# Patient Record
Sex: FEMALE | Race: WHITE | Employment: OTHER | URBAN - METROPOLITAN AREA
[De-identification: names, ages, dates, MRNs, and addresses within clinical notes are randomized per-mention and may not be internally consistent; named-entity substitution may affect disease eponyms.]

---

## 2018-01-01 ENCOUNTER — APPOINTMENT (OUTPATIENT)
Dept: GENERAL RADIOLOGY | Age: 83
DRG: 064 | End: 2018-01-01
Payer: MEDICARE

## 2018-01-01 ENCOUNTER — HOSPITAL ENCOUNTER (INPATIENT)
Age: 83
LOS: 3 days | Discharge: NURSING, REHAB OR LONG-TERM CARE | DRG: 086 | End: 2018-09-20
Attending: EMERGENCY MEDICINE | Admitting: INTERNAL MEDICINE
Payer: MEDICARE

## 2018-01-01 ENCOUNTER — APPOINTMENT (OUTPATIENT)
Dept: CT IMAGING | Age: 83
DRG: 087 | End: 2018-01-01
Payer: MEDICARE

## 2018-01-01 ENCOUNTER — HOSPITAL ENCOUNTER (INPATIENT)
Age: 83
LOS: 7 days | DRG: 064 | End: 2018-09-30
Attending: EMERGENCY MEDICINE | Admitting: INTERNAL MEDICINE
Payer: MEDICARE

## 2018-01-01 ENCOUNTER — APPOINTMENT (OUTPATIENT)
Dept: GENERAL RADIOLOGY | Age: 83
DRG: 086 | End: 2018-01-01
Payer: MEDICARE

## 2018-01-01 ENCOUNTER — APPOINTMENT (OUTPATIENT)
Dept: CT IMAGING | Age: 83
DRG: 064 | End: 2018-01-01
Payer: MEDICARE

## 2018-01-01 ENCOUNTER — APPOINTMENT (OUTPATIENT)
Dept: CT IMAGING | Age: 83
DRG: 086 | End: 2018-01-01
Payer: MEDICARE

## 2018-01-01 ENCOUNTER — HOSPITAL ENCOUNTER (INPATIENT)
Age: 83
LOS: 2 days | Discharge: HOME HEALTH | DRG: 087 | End: 2018-09-05
Attending: EMERGENCY MEDICINE | Admitting: INTERNAL MEDICINE
Payer: MEDICARE

## 2018-01-01 ENCOUNTER — HOSPITAL ENCOUNTER (INPATIENT)
Age: 83
LOS: 1 days | DRG: 064 | End: 2018-09-23
Attending: EMERGENCY MEDICINE | Admitting: INTERNAL MEDICINE
Payer: MEDICARE

## 2018-01-01 VITALS
RESPIRATION RATE: 16 BRPM | WEIGHT: 150 LBS | TEMPERATURE: 97 F | OXYGEN SATURATION: 95 % | DIASTOLIC BLOOD PRESSURE: 57 MMHG | BODY MASS INDEX: 27.44 KG/M2 | SYSTOLIC BLOOD PRESSURE: 134 MMHG | HEART RATE: 79 BPM

## 2018-01-01 VITALS
HEART RATE: 75 BPM | HEIGHT: 63 IN | TEMPERATURE: 97.7 F | RESPIRATION RATE: 16 BRPM | OXYGEN SATURATION: 99 % | BODY MASS INDEX: 29.23 KG/M2 | WEIGHT: 165 LBS | DIASTOLIC BLOOD PRESSURE: 61 MMHG | SYSTOLIC BLOOD PRESSURE: 127 MMHG

## 2018-01-01 VITALS
HEIGHT: 63 IN | BODY MASS INDEX: 21.79 KG/M2 | RESPIRATION RATE: 24 BRPM | HEART RATE: 96 BPM | OXYGEN SATURATION: 90 % | SYSTOLIC BLOOD PRESSURE: 101 MMHG | TEMPERATURE: 99 F | WEIGHT: 123 LBS | DIASTOLIC BLOOD PRESSURE: 46 MMHG

## 2018-01-01 VITALS
DIASTOLIC BLOOD PRESSURE: 68 MMHG | HEART RATE: 82 BPM | HEIGHT: 63 IN | SYSTOLIC BLOOD PRESSURE: 155 MMHG | WEIGHT: 133.1 LBS | TEMPERATURE: 97.5 F | RESPIRATION RATE: 17 BRPM | OXYGEN SATURATION: 98 % | BODY MASS INDEX: 23.58 KG/M2

## 2018-01-01 DIAGNOSIS — S06.5XAA SUBDURAL HEMATOMA: ICD-10-CM

## 2018-01-01 DIAGNOSIS — S06.5XAA SUBDURAL HEMATOMA: Primary | ICD-10-CM

## 2018-01-01 DIAGNOSIS — D69.6 THROMBOCYTOPENIA (HCC): ICD-10-CM

## 2018-01-01 DIAGNOSIS — R41.82 ALTERED MENTAL STATUS, UNSPECIFIED ALTERED MENTAL STATUS TYPE: ICD-10-CM

## 2018-01-01 DIAGNOSIS — E86.0 DEHYDRATION: Primary | ICD-10-CM

## 2018-01-01 DIAGNOSIS — D68.9 COAGULOPATHY (HCC): ICD-10-CM

## 2018-01-01 DIAGNOSIS — R31.9 URINARY TRACT INFECTION WITH HEMATURIA, SITE UNSPECIFIED: ICD-10-CM

## 2018-01-01 DIAGNOSIS — D61.818 PANCYTOPENIA (HCC): ICD-10-CM

## 2018-01-01 DIAGNOSIS — R91.8 LUNG MASS: ICD-10-CM

## 2018-01-01 DIAGNOSIS — S00.12XA PERIORBITAL ECCHYMOSIS OF LEFT EYE, INITIAL ENCOUNTER: ICD-10-CM

## 2018-01-01 DIAGNOSIS — S06.5XAA SDH (SUBDURAL HEMATOMA): ICD-10-CM

## 2018-01-01 DIAGNOSIS — E86.0 DEHYDRATION: ICD-10-CM

## 2018-01-01 DIAGNOSIS — N39.0 URINARY TRACT INFECTION WITH HEMATURIA, SITE UNSPECIFIED: ICD-10-CM

## 2018-01-01 DIAGNOSIS — R41.82 ALTERED MENTAL STATUS, UNSPECIFIED ALTERED MENTAL STATUS TYPE: Primary | ICD-10-CM

## 2018-01-01 DIAGNOSIS — D61.818 PANCYTOPENIA (HCC): Primary | ICD-10-CM

## 2018-01-01 LAB
% INHIBITION AA: 53.7 %
% INHIBITION AA: 66.5 %
% INHIBITION ADP: 32.7 %
% INHIBITION ADP: 71.7 %
ABO/RH: NORMAL
ABO/RH: NORMAL
ALBUMIN SERPL-MCNC: 2 G/DL (ref 3.5–5.2)
ALBUMIN SERPL-MCNC: 2.2 G/DL (ref 3.5–5.2)
ALBUMIN SERPL-MCNC: 3.3 G/DL (ref 3.5–5.2)
ALBUMIN SERPL-MCNC: 3.4 G/DL (ref 3.5–5.2)
ALP BLD-CCNC: 195 U/L (ref 35–104)
ALP BLD-CCNC: 83 U/L (ref 35–104)
ALP BLD-CCNC: 87 U/L (ref 35–104)
ALP BLD-CCNC: 95 U/L (ref 35–104)
ALT SERPL-CCNC: 18 U/L (ref 0–32)
ALT SERPL-CCNC: 20 U/L (ref 0–32)
ALT SERPL-CCNC: 24 U/L (ref 0–32)
ALT SERPL-CCNC: 31 U/L (ref 0–32)
AMYLASE: 20 U/L (ref 20–100)
ANGLE (CLOT STRENGTH): 70 DEGREE (ref 59–74)
ANGLE (CLOT STRENGTH): 77 DEGREE (ref 59–74)
ANION GAP SERPL CALCULATED.3IONS-SCNC: 12 MMOL/L (ref 7–16)
ANION GAP SERPL CALCULATED.3IONS-SCNC: 14 MMOL/L (ref 7–16)
ANION GAP SERPL CALCULATED.3IONS-SCNC: 15 MMOL/L (ref 7–16)
ANION GAP SERPL CALCULATED.3IONS-SCNC: 16 MMOL/L (ref 7–16)
ANION GAP SERPL CALCULATED.3IONS-SCNC: 17 MMOL/L (ref 7–16)
ANION GAP SERPL CALCULATED.3IONS-SCNC: 18 MMOL/L (ref 7–16)
ANION GAP SERPL CALCULATED.3IONS-SCNC: 18 MMOL/L (ref 7–16)
ANION GAP SERPL CALCULATED.3IONS-SCNC: 20 MMOL/L (ref 7–16)
ANION GAP SERPL CALCULATED.3IONS-SCNC: 21 MMOL/L (ref 7–16)
ANION GAP SERPL CALCULATED.3IONS-SCNC: 22 MMOL/L (ref 7–16)
ANISOCYTOSIS: ABNORMAL
ANTIBODY SCREEN: NORMAL
ANTIBODY SCREEN: NORMAL
APTT: 24.3 SEC (ref 24.5–35.1)
APTT: 25.2 SEC (ref 24.5–35.1)
APTT: 26.1 SEC (ref 24.5–35.1)
APTT: 28.1 SEC (ref 24.5–35.1)
AST SERPL-CCNC: 27 U/L (ref 0–31)
AST SERPL-CCNC: 27 U/L (ref 0–31)
AST SERPL-CCNC: 31 U/L (ref 0–31)
AST SERPL-CCNC: 87 U/L (ref 0–31)
ATYPICAL LYMPHOCYTE RELATIVE PERCENT: 0.9 % (ref 0–4)
BACTERIA: ABNORMAL /HPF
BACTERIA: ABNORMAL /HPF
BASOPHILS ABSOLUTE: 0 E9/L (ref 0–0.2)
BASOPHILS ABSOLUTE: 0.01 E9/L (ref 0–0.2)
BASOPHILS ABSOLUTE: 0.05 E9/L (ref 0–0.2)
BASOPHILS RELATIVE PERCENT: 0 % (ref 0–2)
BASOPHILS RELATIVE PERCENT: 0.3 % (ref 0–2)
BASOPHILS RELATIVE PERCENT: 0.5 % (ref 0–2)
BASOPHILS RELATIVE PERCENT: 0.5 % (ref 0–2)
BASOPHILS RELATIVE PERCENT: 0.8 % (ref 0–2)
BASOPHILS RELATIVE PERCENT: 0.9 % (ref 0–2)
BILIRUB SERPL-MCNC: 0.7 MG/DL (ref 0–1.2)
BILIRUB SERPL-MCNC: 0.8 MG/DL (ref 0–1.2)
BILIRUB SERPL-MCNC: 0.8 MG/DL (ref 0–1.2)
BILIRUB SERPL-MCNC: 1 MG/DL (ref 0–1.2)
BILIRUBIN DIRECT: 0.4 MG/DL (ref 0–0.3)
BILIRUBIN URINE: NEGATIVE
BILIRUBIN, INDIRECT: 0.6 MG/DL (ref 0–1)
BLASTS RELATIVE PERCENT: 0.9 % (ref 0–0)
BLASTS RELATIVE PERCENT: 0.9 % (ref 0–0)
BLOOD BANK DISPENSE STATUS: NORMAL
BLOOD BANK PRODUCT CODE: NORMAL
BLOOD CULTURE, ROUTINE: NORMAL
BLOOD, URINE: ABNORMAL
BLOOD, URINE: NEGATIVE
BLOOD, URINE: NEGATIVE
BPU ID: NORMAL
BUN BLDV-MCNC: 20 MG/DL (ref 8–23)
BUN BLDV-MCNC: 22 MG/DL (ref 8–23)
BUN BLDV-MCNC: 23 MG/DL (ref 8–23)
BUN BLDV-MCNC: 27 MG/DL (ref 8–23)
BUN BLDV-MCNC: 30 MG/DL (ref 8–23)
BUN BLDV-MCNC: 32 MG/DL (ref 8–23)
BUN BLDV-MCNC: 34 MG/DL (ref 8–23)
BUN BLDV-MCNC: 34 MG/DL (ref 8–23)
BUN BLDV-MCNC: 35 MG/DL (ref 8–23)
BUN BLDV-MCNC: 39 MG/DL (ref 8–23)
BUN BLDV-MCNC: 40 MG/DL (ref 8–23)
BUN BLDV-MCNC: 44 MG/DL (ref 8–23)
BURR CELLS: ABNORMAL
CALCIUM SERPL-MCNC: 10.4 MG/DL (ref 8.6–10.2)
CALCIUM SERPL-MCNC: 8.6 MG/DL (ref 8.6–10.2)
CALCIUM SERPL-MCNC: 9.1 MG/DL (ref 8.6–10.2)
CALCIUM SERPL-MCNC: 9.3 MG/DL (ref 8.6–10.2)
CALCIUM SERPL-MCNC: 9.4 MG/DL (ref 8.6–10.2)
CALCIUM SERPL-MCNC: 9.5 MG/DL (ref 8.6–10.2)
CALCIUM SERPL-MCNC: 9.5 MG/DL (ref 8.6–10.2)
CALCIUM SERPL-MCNC: 9.7 MG/DL (ref 8.6–10.2)
CALCIUM SERPL-MCNC: 9.8 MG/DL (ref 8.6–10.2)
CALCIUM SERPL-MCNC: 9.9 MG/DL (ref 8.6–10.2)
CHLORIDE BLD-SCNC: 102 MMOL/L (ref 98–107)
CHLORIDE BLD-SCNC: 103 MMOL/L (ref 98–107)
CHLORIDE BLD-SCNC: 104 MMOL/L (ref 98–107)
CHLORIDE BLD-SCNC: 109 MMOL/L (ref 98–107)
CHLORIDE BLD-SCNC: 110 MMOL/L (ref 98–107)
CHLORIDE BLD-SCNC: 112 MMOL/L (ref 98–107)
CHLORIDE BLD-SCNC: 113 MMOL/L (ref 98–107)
CHLORIDE BLD-SCNC: 114 MMOL/L (ref 98–107)
CHLORIDE BLD-SCNC: 117 MMOL/L (ref 98–107)
CHLORIDE BLD-SCNC: 99 MMOL/L (ref 98–107)
CHP ED QC CHECK: YES
CK MB: <1 NG/ML (ref 0–4.3)
CLARITY: ABNORMAL
CLARITY: CLEAR
CLARITY: CLEAR
CO2: 16 MMOL/L (ref 22–29)
CO2: 18 MMOL/L (ref 22–29)
CO2: 19 MMOL/L (ref 22–29)
CO2: 20 MMOL/L (ref 22–29)
CO2: 21 MMOL/L (ref 22–29)
CO2: 22 MMOL/L (ref 22–29)
CO2: 22 MMOL/L (ref 22–29)
CO2: 23 MMOL/L (ref 22–29)
CO2: 24 MMOL/L (ref 22–29)
CO2: 24 MMOL/L (ref 22–29)
COLOR: YELLOW
CREAT SERPL-MCNC: 0.8 MG/DL (ref 0.5–1)
CREAT SERPL-MCNC: 0.8 MG/DL (ref 0.5–1)
CREAT SERPL-MCNC: 0.9 MG/DL (ref 0.5–1)
CREAT SERPL-MCNC: 1 MG/DL (ref 0.5–1)
CREAT SERPL-MCNC: 1.1 MG/DL (ref 0.5–1)
CREAT SERPL-MCNC: 1.6 MG/DL (ref 0.5–1)
CULTURE, BLOOD 2: NORMAL
DESCRIPTION BLOOD BANK: NORMAL
EKG ATRIAL RATE: 72 BPM
EKG ATRIAL RATE: 85 BPM
EKG ATRIAL RATE: 88 BPM
EKG P AXIS: 23 DEGREES
EKG P AXIS: 42 DEGREES
EKG P AXIS: 59 DEGREES
EKG P-R INTERVAL: 144 MS
EKG P-R INTERVAL: 176 MS
EKG P-R INTERVAL: 184 MS
EKG Q-T INTERVAL: 382 MS
EKG Q-T INTERVAL: 398 MS
EKG Q-T INTERVAL: 410 MS
EKG QRS DURATION: 112 MS
EKG QRS DURATION: 118 MS
EKG QRS DURATION: 122 MS
EKG QTC CALCULATION (BAZETT): 435 MS
EKG QTC CALCULATION (BAZETT): 454 MS
EKG QTC CALCULATION (BAZETT): 496 MS
EKG R AXIS: -3 DEGREES
EKG R AXIS: -6 DEGREES
EKG R AXIS: -9 DEGREES
EKG T AXIS: 17 DEGREES
EKG T AXIS: 19 DEGREES
EKG T AXIS: 27 DEGREES
EKG VENTRICULAR RATE: 72 BPM
EKG VENTRICULAR RATE: 85 BPM
EKG VENTRICULAR RATE: 88 BPM
EOSINOPHILS ABSOLUTE: 0 E9/L (ref 0.05–0.5)
EOSINOPHILS ABSOLUTE: 0.01 E9/L (ref 0.05–0.5)
EOSINOPHILS ABSOLUTE: 0.04 E9/L (ref 0.05–0.5)
EOSINOPHILS ABSOLUTE: 0.08 E9/L (ref 0.05–0.5)
EOSINOPHILS ABSOLUTE: 0.09 E9/L (ref 0.05–0.5)
EOSINOPHILS RELATIVE PERCENT: 0 % (ref 0–6)
EOSINOPHILS RELATIVE PERCENT: 0 % (ref 0–6)
EOSINOPHILS RELATIVE PERCENT: 0.2 % (ref 0–6)
EOSINOPHILS RELATIVE PERCENT: 0.2 % (ref 0–6)
EOSINOPHILS RELATIVE PERCENT: 0.3 % (ref 0–6)
EOSINOPHILS RELATIVE PERCENT: 0.5 % (ref 0–6)
EOSINOPHILS RELATIVE PERCENT: 1 % (ref 0–6)
EOSINOPHILS RELATIVE PERCENT: 1 % (ref 0–6)
EOSINOPHILS RELATIVE PERCENT: 1.7 % (ref 0–6)
EPITHELIAL CELLS, UA: ABNORMAL /HPF
EPL-TEG: 0 % (ref 0–15)
EPL-TEG: 0 % (ref 0–15)
FILM ARRAY ADENOVIRUS: NORMAL
FILM ARRAY BORDETELLA PERTUSSIS: NORMAL
FILM ARRAY CHLAMYDOPHILIA PNEUMONIAE: NORMAL
FILM ARRAY CORONAVIRUS 229E: NORMAL
FILM ARRAY CORONAVIRUS HKU1: NORMAL
FILM ARRAY CORONAVIRUS NL63: NORMAL
FILM ARRAY CORONAVIRUS OC43: NORMAL
FILM ARRAY INFLUENZA A VIRUS 09H1: NORMAL
FILM ARRAY INFLUENZA A VIRUS H1: NORMAL
FILM ARRAY INFLUENZA A VIRUS H3: NORMAL
FILM ARRAY INFLUENZA A VIRUS: NORMAL
FILM ARRAY INFLUENZA B: NORMAL
FILM ARRAY METAPNEUMOVIRUS: NORMAL
FILM ARRAY MYCOPLASMA PNEUMONIAE: NORMAL
FILM ARRAY PARAINFLUENZA VIRUS 1: NORMAL
FILM ARRAY PARAINFLUENZA VIRUS 2: NORMAL
FILM ARRAY PARAINFLUENZA VIRUS 3: NORMAL
FILM ARRAY PARAINFLUENZA VIRUS 4: NORMAL
FILM ARRAY RESPIRATORY SYNCITIAL VIRUS: NORMAL
FILM ARRAY RHINOVIRUS/ENTEROVIRUS: NORMAL
G-TEG: 11.4 K D/SC (ref 4.5–11)
G-TEG: 6.4 K D/SC (ref 4.5–11)
GFR AFRICAN AMERICAN: 37
GFR AFRICAN AMERICAN: 57
GFR AFRICAN AMERICAN: >60
GFR NON-AFRICAN AMERICAN: 31 ML/MIN/1.73
GFR NON-AFRICAN AMERICAN: 47 ML/MIN/1.73
GFR NON-AFRICAN AMERICAN: 52 ML/MIN/1.73
GFR NON-AFRICAN AMERICAN: 59 ML/MIN/1.73
GFR NON-AFRICAN AMERICAN: >60 ML/MIN/1.73
GFR NON-AFRICAN AMERICAN: >60 ML/MIN/1.73
GLUCOSE BLD-MCNC: 107 MG/DL (ref 74–109)
GLUCOSE BLD-MCNC: 108 MG/DL (ref 74–109)
GLUCOSE BLD-MCNC: 113 MG/DL (ref 74–109)
GLUCOSE BLD-MCNC: 113 MG/DL (ref 74–109)
GLUCOSE BLD-MCNC: 115 MG/DL (ref 74–109)
GLUCOSE BLD-MCNC: 117 MG/DL
GLUCOSE BLD-MCNC: 117 MG/DL (ref 74–109)
GLUCOSE BLD-MCNC: 122 MG/DL (ref 74–109)
GLUCOSE BLD-MCNC: 130 MG/DL (ref 74–109)
GLUCOSE BLD-MCNC: 139 MG/DL (ref 74–109)
GLUCOSE BLD-MCNC: 151 MG/DL (ref 74–109)
GLUCOSE BLD-MCNC: 338 MG/DL (ref 74–109)
GLUCOSE BLD-MCNC: 46 MG/DL (ref 74–109)
GLUCOSE BLD-MCNC: 76 MG/DL (ref 74–109)
GLUCOSE BLD-MCNC: 95 MG/DL (ref 74–109)
GLUCOSE URINE: NEGATIVE MG/DL
HCT VFR BLD CALC: 20.1 % (ref 34–48)
HCT VFR BLD CALC: 20.2 % (ref 34–48)
HCT VFR BLD CALC: 20.5 % (ref 34–48)
HCT VFR BLD CALC: 21.1 % (ref 34–48)
HCT VFR BLD CALC: 24.3 % (ref 34–48)
HCT VFR BLD CALC: 25.1 % (ref 34–48)
HCT VFR BLD CALC: 26 % (ref 34–48)
HCT VFR BLD CALC: 26.9 % (ref 34–48)
HCT VFR BLD CALC: 27.1 % (ref 34–48)
HCT VFR BLD CALC: 27.6 % (ref 34–48)
HCT VFR BLD CALC: 27.8 % (ref 34–48)
HCT VFR BLD CALC: 27.9 % (ref 34–48)
HCT VFR BLD CALC: 28.4 % (ref 34–48)
HCT VFR BLD CALC: 29.1 % (ref 34–48)
HCT VFR BLD CALC: 29.7 % (ref 34–48)
HEMOGLOBIN: 6.1 G/DL (ref 11.5–15.5)
HEMOGLOBIN: 6.2 G/DL (ref 11.5–15.5)
HEMOGLOBIN: 6.6 G/DL (ref 11.5–15.5)
HEMOGLOBIN: 6.6 G/DL (ref 11.5–15.5)
HEMOGLOBIN: 7.5 G/DL (ref 11.5–15.5)
HEMOGLOBIN: 7.7 G/DL (ref 11.5–15.5)
HEMOGLOBIN: 8.2 G/DL (ref 11.5–15.5)
HEMOGLOBIN: 8.2 G/DL (ref 11.5–15.5)
HEMOGLOBIN: 8.5 G/DL (ref 11.5–15.5)
HEMOGLOBIN: 8.6 G/DL (ref 11.5–15.5)
HEMOGLOBIN: 8.7 G/DL (ref 11.5–15.5)
HEMOGLOBIN: 8.8 G/DL (ref 11.5–15.5)
HEMOGLOBIN: 9.4 G/DL (ref 11.5–15.5)
HEMOGLOBIN: 9.5 G/DL (ref 11.5–15.5)
HEMOGLOBIN: 9.8 G/DL (ref 11.5–15.5)
HYPOCHROMIA: ABNORMAL
IMMATURE GRANULOCYTES #: 0.37 E9/L
IMMATURE GRANULOCYTES %: 11.8 % (ref 0–5)
INR BLD: 1.2
INR BLD: 1.4
INR BLD: 1.5
INR BLD: 2
K (CLOTTING TIME): 0.8 MIN (ref 1–3)
K (CLOTTING TIME): 1.3 MIN (ref 1–3)
KETONES, URINE: NEGATIVE MG/DL
LACTIC ACID: 1.2 MMOL/L (ref 0.5–2.2)
LACTIC ACID: 2 MMOL/L (ref 0.5–2.2)
LEUKOCYTE ESTERASE, URINE: ABNORMAL
LEUKOCYTE ESTERASE, URINE: NEGATIVE
LEUKOCYTE ESTERASE, URINE: NEGATIVE
LIPASE: 6 U/L (ref 13–60)
LY30 (FIBRINOLYSIS): 0 % (ref 0–8)
LY30 (FIBRINOLYSIS): 0 % (ref 0–8)
LYMPHOCYTES ABSOLUTE: 0.92 E9/L (ref 1.5–4)
LYMPHOCYTES ABSOLUTE: 1.05 E9/L (ref 1.5–4)
LYMPHOCYTES ABSOLUTE: 1.16 E9/L (ref 1.5–4)
LYMPHOCYTES ABSOLUTE: 1.17 E9/L (ref 1.5–4)
LYMPHOCYTES ABSOLUTE: 1.63 E9/L (ref 1.5–4)
LYMPHOCYTES ABSOLUTE: 1.85 E9/L (ref 1.5–4)
LYMPHOCYTES ABSOLUTE: 2.11 E9/L (ref 1.5–4)
LYMPHOCYTES ABSOLUTE: 2.18 E9/L (ref 1.5–4)
LYMPHOCYTES ABSOLUTE: 2.46 E9/L (ref 1.5–4)
LYMPHOCYTES RELATIVE PERCENT: 20 % (ref 20–42)
LYMPHOCYTES RELATIVE PERCENT: 27 % (ref 20–42)
LYMPHOCYTES RELATIVE PERCENT: 30.4 % (ref 20–42)
LYMPHOCYTES RELATIVE PERCENT: 37 % (ref 20–42)
LYMPHOCYTES RELATIVE PERCENT: 37.1 % (ref 20–42)
LYMPHOCYTES RELATIVE PERCENT: 44 % (ref 20–42)
LYMPHOCYTES RELATIVE PERCENT: 45 % (ref 20–42)
LYMPHOCYTES RELATIVE PERCENT: 56.2 % (ref 20–42)
LYMPHOCYTES RELATIVE PERCENT: 9 % (ref 20–42)
MA (MAX AMPLITUDE): 56.3 MM (ref 50–70)
MA (MAX AMPLITUDE): 69.5 MM (ref 50–70)
MA-AA: 41 MM
MA-AA: 50.6 MM
MA-ACTIVATED: 33.3 MM
MA-ACTIVATED: 34.3 MM
MA-ADP: 39.8 MM
MA-ADP: 58 MM
MA-TEG BASELINE: 56.3 MM
MA-TEG BASELINE: 69.5 MM
MAGNESIUM: 2.1 MG/DL (ref 1.6–2.6)
MCH RBC QN AUTO: 26.9 PG (ref 26–35)
MCH RBC QN AUTO: 27.3 PG (ref 26–35)
MCH RBC QN AUTO: 27.4 PG (ref 26–35)
MCH RBC QN AUTO: 27.5 PG (ref 26–35)
MCH RBC QN AUTO: 27.6 PG (ref 26–35)
MCH RBC QN AUTO: 27.7 PG (ref 26–35)
MCH RBC QN AUTO: 27.7 PG (ref 26–35)
MCH RBC QN AUTO: 27.9 PG (ref 26–35)
MCH RBC QN AUTO: 28.2 PG (ref 26–35)
MCH RBC QN AUTO: 28.4 PG (ref 26–35)
MCH RBC QN AUTO: 28.6 PG (ref 26–35)
MCH RBC QN AUTO: 28.7 PG (ref 26–35)
MCH RBC QN AUTO: 28.9 PG (ref 26–35)
MCHC RBC AUTO-ENTMCNC: 30.2 % (ref 32–34.5)
MCHC RBC AUTO-ENTMCNC: 30.3 % (ref 32–34.5)
MCHC RBC AUTO-ENTMCNC: 30.5 % (ref 32–34.5)
MCHC RBC AUTO-ENTMCNC: 30.7 % (ref 32–34.5)
MCHC RBC AUTO-ENTMCNC: 30.8 % (ref 32–34.5)
MCHC RBC AUTO-ENTMCNC: 30.8 % (ref 32–34.5)
MCHC RBC AUTO-ENTMCNC: 30.9 % (ref 32–34.5)
MCHC RBC AUTO-ENTMCNC: 31 % (ref 32–34.5)
MCHC RBC AUTO-ENTMCNC: 31.3 % (ref 32–34.5)
MCHC RBC AUTO-ENTMCNC: 31.5 % (ref 32–34.5)
MCHC RBC AUTO-ENTMCNC: 32.1 % (ref 32–34.5)
MCHC RBC AUTO-ENTMCNC: 32.6 % (ref 32–34.5)
MCHC RBC AUTO-ENTMCNC: 32.7 % (ref 32–34.5)
MCHC RBC AUTO-ENTMCNC: 33 % (ref 32–34.5)
MCHC RBC AUTO-ENTMCNC: 33.8 % (ref 32–34.5)
MCV RBC AUTO: 85.5 FL (ref 80–99.9)
MCV RBC AUTO: 85.6 FL (ref 80–99.9)
MCV RBC AUTO: 86 FL (ref 80–99.9)
MCV RBC AUTO: 86.1 FL (ref 80–99.9)
MCV RBC AUTO: 86.3 FL (ref 80–99.9)
MCV RBC AUTO: 87.6 FL (ref 80–99.9)
MCV RBC AUTO: 88.5 FL (ref 80–99.9)
MCV RBC AUTO: 88.6 FL (ref 80–99.9)
MCV RBC AUTO: 89.3 FL (ref 80–99.9)
MCV RBC AUTO: 89.3 FL (ref 80–99.9)
MCV RBC AUTO: 90 FL (ref 80–99.9)
MCV RBC AUTO: 90.9 FL (ref 80–99.9)
MCV RBC AUTO: 90.9 FL (ref 80–99.9)
MCV RBC AUTO: 91.5 FL (ref 80–99.9)
MCV RBC AUTO: 92.2 FL (ref 80–99.9)
METAMYELOCYTES RELATIVE PERCENT: 0.9 % (ref 0–1)
METAMYELOCYTES RELATIVE PERCENT: 1.7 % (ref 0–1)
METAMYELOCYTES RELATIVE PERCENT: 2 % (ref 0–1)
METAMYELOCYTES RELATIVE PERCENT: 2.6 % (ref 0–1)
METAMYELOCYTES RELATIVE PERCENT: 2.8 % (ref 0–1)
METAMYELOCYTES RELATIVE PERCENT: 3 % (ref 0–1)
METAMYELOCYTES RELATIVE PERCENT: 4 % (ref 0–1)
METER GLUCOSE: 117 MG/DL (ref 70–110)
MONOCYTES ABSOLUTE: 0 E9/L (ref 0.1–0.95)
MONOCYTES ABSOLUTE: 0.12 E9/L (ref 0.1–0.95)
MONOCYTES ABSOLUTE: 0.16 E9/L (ref 0.1–0.95)
MONOCYTES ABSOLUTE: 0.17 E9/L (ref 0.1–0.95)
MONOCYTES ABSOLUTE: 0.2 E9/L (ref 0.1–0.95)
MONOCYTES ABSOLUTE: 0.22 E9/L (ref 0.1–0.95)
MONOCYTES ABSOLUTE: 0.3 E9/L (ref 0.1–0.95)
MONOCYTES ABSOLUTE: 0.61 E9/L (ref 0.1–0.95)
MONOCYTES ABSOLUTE: 0.78 E9/L (ref 0.1–0.95)
MONOCYTES RELATIVE PERCENT: 10 % (ref 2–12)
MONOCYTES RELATIVE PERCENT: 12.2 % (ref 2–12)
MONOCYTES RELATIVE PERCENT: 2.8 % (ref 2–12)
MONOCYTES RELATIVE PERCENT: 2.9 % (ref 2–12)
MONOCYTES RELATIVE PERCENT: 3.5 % (ref 2–12)
MONOCYTES RELATIVE PERCENT: 4.3 % (ref 2–12)
MONOCYTES RELATIVE PERCENT: 5 % (ref 2–12)
MONOCYTES RELATIVE PERCENT: 6 % (ref 2–12)
MONOCYTES RELATIVE PERCENT: 9.6 % (ref 2–12)
MYELOCYTE PERCENT: 1.9 % (ref 0–0)
MYELOCYTE PERCENT: 2 % (ref 0–0)
MYELOCYTE PERCENT: 3.5 % (ref 0–0)
MYELOCYTE PERCENT: 3.5 % (ref 0–0)
NEUTROPHILS ABSOLUTE: 1.28 E9/L (ref 1.8–7.3)
NEUTROPHILS ABSOLUTE: 1.6 E9/L (ref 1.8–7.3)
NEUTROPHILS ABSOLUTE: 2.17 E9/L (ref 1.8–7.3)
NEUTROPHILS ABSOLUTE: 2.46 E9/L (ref 1.8–7.3)
NEUTROPHILS ABSOLUTE: 2.54 E9/L (ref 1.8–7.3)
NEUTROPHILS ABSOLUTE: 2.86 E9/L (ref 1.8–7.3)
NEUTROPHILS ABSOLUTE: 3.65 E9/L (ref 1.8–7.3)
NEUTROPHILS ABSOLUTE: 4.84 E9/L (ref 1.8–7.3)
NEUTROPHILS ABSOLUTE: 8.36 E9/L (ref 1.8–7.3)
NEUTROPHILS RELATIVE PERCENT: 39 % (ref 43–80)
NEUTROPHILS RELATIVE PERCENT: 40.9 % (ref 43–80)
NEUTROPHILS RELATIVE PERCENT: 48.6 % (ref 43–80)
NEUTROPHILS RELATIVE PERCENT: 52 % (ref 43–80)
NEUTROPHILS RELATIVE PERCENT: 52.3 % (ref 43–80)
NEUTROPHILS RELATIVE PERCENT: 57 % (ref 43–80)
NEUTROPHILS RELATIVE PERCENT: 59.1 % (ref 43–80)
NEUTROPHILS RELATIVE PERCENT: 67.8 % (ref 43–80)
NEUTROPHILS RELATIVE PERCENT: 76 % (ref 43–80)
NITRITE, URINE: NEGATIVE
NUCLEATED RED BLOOD CELLS: 0.9 /100 WBC
NUCLEATED RED BLOOD CELLS: 1 /100 WBC
NUCLEATED RED BLOOD CELLS: 2 /100 WBC
ORGANISM: ABNORMAL
ORGANISM: ABNORMAL
OVALOCYTES: ABNORMAL
PDW BLD-RTO: 15.1 FL (ref 11.5–15)
PDW BLD-RTO: 15.4 FL (ref 11.5–15)
PDW BLD-RTO: 15.6 FL (ref 11.5–15)
PDW BLD-RTO: 15.8 FL (ref 11.5–15)
PDW BLD-RTO: 16.2 FL (ref 11.5–15)
PDW BLD-RTO: 16.6 FL (ref 11.5–15)
PDW BLD-RTO: 16.7 FL (ref 11.5–15)
PDW BLD-RTO: 16.8 FL (ref 11.5–15)
PDW BLD-RTO: 16.8 FL (ref 11.5–15)
PDW BLD-RTO: 16.9 FL (ref 11.5–15)
PDW BLD-RTO: 17 FL (ref 11.5–15)
PDW BLD-RTO: 17.2 FL (ref 11.5–15)
PDW BLD-RTO: 17.2 FL (ref 11.5–15)
PH UA: 5 (ref 5–9)
PH UA: 5.5 (ref 5–9)
PH UA: 5.5 (ref 5–9)
PLATELET # BLD: 11 E9/L (ref 130–450)
PLATELET # BLD: 13 E9/L (ref 130–450)
PLATELET # BLD: 14 E9/L (ref 130–450)
PLATELET # BLD: 16 E9/L (ref 130–450)
PLATELET # BLD: 19 E9/L (ref 130–450)
PLATELET # BLD: 22 E9/L (ref 130–450)
PLATELET # BLD: 22 E9/L (ref 130–450)
PLATELET # BLD: 26 E9/L (ref 130–450)
PLATELET # BLD: 4 E9/L (ref 130–450)
PLATELET # BLD: 7 E9/L (ref 130–450)
PLATELET # BLD: 73 E9/L (ref 130–450)
PLATELET # BLD: 8 E9/L (ref 130–450)
PLATELET # BLD: 93 E9/L (ref 130–450)
PLATELET CONFIRMATION: NORMAL
PMV BLD AUTO: 10.4 FL (ref 7–12)
PMV BLD AUTO: 10.6 FL (ref 7–12)
PMV BLD AUTO: 10.9 FL (ref 7–12)
PMV BLD AUTO: 11.4 FL (ref 7–12)
PMV BLD AUTO: 11.6 FL (ref 7–12)
PMV BLD AUTO: 12.2 FL (ref 7–12)
PMV BLD AUTO: 9.5 FL (ref 7–12)
PMV BLD AUTO: 9.8 FL (ref 7–12)
PMV BLD AUTO: ABNORMAL FL (ref 7–12)
POIKILOCYTES: ABNORMAL
POLYCHROMASIA: ABNORMAL
POTASSIUM SERPL-SCNC: 2.9 MMOL/L (ref 3.5–5)
POTASSIUM SERPL-SCNC: 3.1 MMOL/L (ref 3.5–5)
POTASSIUM SERPL-SCNC: 3.2 MMOL/L (ref 3.5–5)
POTASSIUM SERPL-SCNC: 3.5 MMOL/L (ref 3.5–5)
POTASSIUM SERPL-SCNC: 3.7 MMOL/L (ref 3.5–5)
POTASSIUM SERPL-SCNC: 3.9 MMOL/L (ref 3.5–5)
POTASSIUM SERPL-SCNC: 3.9 MMOL/L (ref 3.5–5)
POTASSIUM SERPL-SCNC: 4 MMOL/L (ref 3.5–5)
POTASSIUM SERPL-SCNC: 4 MMOL/L (ref 3.5–5)
POTASSIUM SERPL-SCNC: 4.1 MMOL/L (ref 3.5–5)
POTASSIUM SERPL-SCNC: 4.1 MMOL/L (ref 3.5–5)
POTASSIUM SERPL-SCNC: 4.3 MMOL/L (ref 3.5–5)
POTASSIUM SERPL-SCNC: 4.6 MMOL/L (ref 3.5–5)
POTASSIUM SERPL-SCNC: 4.9 MMOL/L (ref 3.5–5)
POTASSIUM SERPL-SCNC: 5.5 MMOL/L (ref 3.5–5)
PRO-BNP: 1522 PG/ML (ref 0–450)
PRO-BNP: 5702 PG/ML (ref 0–450)
PROMYELOCYTES PERCENT: 0.9 % (ref 0–0)
PROMYELOCYTES PERCENT: 0.9 % (ref 0–0)
PROMYELOCYTES PERCENT: 1 % (ref 0–0)
PROMYELOCYTES PERCENT: 3 % (ref 0–0)
PROTEIN UA: 30 MG/DL
PROTEIN UA: NEGATIVE MG/DL
PROTEIN UA: NORMAL MG/DL
PROTHROMBIN TIME: 13.8 SEC (ref 9.3–12.4)
PROTHROMBIN TIME: 16 SEC (ref 9.3–12.4)
PROTHROMBIN TIME: 17.4 SEC (ref 9.3–12.4)
PROTHROMBIN TIME: 22.9 SEC (ref 9.3–12.4)
R (REACTION TIME): 5 MIN (ref 5–10)
R (REACTION TIME): 5.5 MIN (ref 5–10)
RBC # BLD: 2.24 E12/L (ref 3.5–5.5)
RBC # BLD: 2.27 E12/L (ref 3.5–5.5)
RBC # BLD: 2.34 E12/L (ref 3.5–5.5)
RBC # BLD: 2.41 E12/L (ref 3.5–5.5)
RBC # BLD: 2.72 E12/L (ref 3.5–5.5)
RBC # BLD: 2.79 E12/L (ref 3.5–5.5)
RBC # BLD: 2.86 E12/L (ref 3.5–5.5)
RBC # BLD: 2.96 E12/L (ref 3.5–5.5)
RBC # BLD: 3.08 E12/L (ref 3.5–5.5)
RBC # BLD: 3.09 E12/L (ref 3.5–5.5)
RBC # BLD: 3.15 E12/L (ref 3.5–5.5)
RBC # BLD: 3.15 E12/L (ref 3.5–5.5)
RBC # BLD: 3.25 E12/L (ref 3.5–5.5)
RBC # BLD: 3.4 E12/L (ref 3.5–5.5)
RBC # BLD: 3.45 E12/L (ref 3.5–5.5)
RBC UA: ABNORMAL /HPF (ref 0–2)
RBC UA: ABNORMAL /HPF (ref 0–2)
SCHISTOCYTES: ABNORMAL
SODIUM BLD-SCNC: 138 MMOL/L (ref 132–146)
SODIUM BLD-SCNC: 138 MMOL/L (ref 132–146)
SODIUM BLD-SCNC: 139 MMOL/L (ref 132–146)
SODIUM BLD-SCNC: 141 MMOL/L (ref 132–146)
SODIUM BLD-SCNC: 141 MMOL/L (ref 132–146)
SODIUM BLD-SCNC: 142 MMOL/L (ref 132–146)
SODIUM BLD-SCNC: 142 MMOL/L (ref 132–146)
SODIUM BLD-SCNC: 143 MMOL/L (ref 132–146)
SODIUM BLD-SCNC: 145 MMOL/L (ref 132–146)
SODIUM BLD-SCNC: 146 MMOL/L (ref 132–146)
SODIUM BLD-SCNC: 147 MMOL/L (ref 132–146)
SODIUM BLD-SCNC: 152 MMOL/L (ref 132–146)
SODIUM BLD-SCNC: 154 MMOL/L (ref 132–146)
SODIUM BLD-SCNC: 157 MMOL/L (ref 132–146)
SPECIFIC GRAVITY UA: 1.01 (ref 1–1.03)
SPECIFIC GRAVITY UA: 1.01 (ref 1–1.03)
SPECIFIC GRAVITY UA: 1.02 (ref 1–1.03)
T3 UPTAKE PERCENT: 32.4 % (ref 22.5–37)
T4 FREE: 0.79 NG/DL (ref 0.93–1.7)
T4 FREE: 0.96 NG/DL (ref 0.93–1.7)
TOTAL CK: 59 U/L (ref 20–180)
TOTAL PROTEIN: 5.6 G/DL (ref 6.4–8.3)
TOTAL PROTEIN: 5.9 G/DL (ref 6.4–8.3)
TOTAL PROTEIN: 7.2 G/DL (ref 6.4–8.3)
TOTAL PROTEIN: 7.4 G/DL (ref 6.4–8.3)
TROPONIN: 0.03 NG/ML (ref 0–0.03)
TROPONIN: <0.01 NG/ML (ref 0–0.03)
TROPONIN: <0.01 NG/ML (ref 0–0.03)
TSH SERPL DL<=0.05 MIU/L-ACNC: 1.02 UIU/ML (ref 0.27–4.2)
TSH SERPL DL<=0.05 MIU/L-ACNC: 1.02 UIU/ML (ref 0.27–4.2)
URINE CULTURE, ROUTINE: ABNORMAL
URINE CULTURE, ROUTINE: NORMAL
URINE CULTURE, ROUTINE: NORMAL
UROBILINOGEN, URINE: 0.2 E.U./DL
UROBILINOGEN, URINE: 1 E.U./DL
UROBILINOGEN, URINE: 4 E.U./DL
WBC # BLD: 10.2 E9/L (ref 4.5–11.5)
WBC # BLD: 3.1 E9/L (ref 4.5–11.5)
WBC # BLD: 3.9 E9/L (ref 4.5–11.5)
WBC # BLD: 4.1 E9/L (ref 4.5–11.5)
WBC # BLD: 4.4 E9/L (ref 4.5–11.5)
WBC # BLD: 4.6 E9/L (ref 4.5–11.5)
WBC # BLD: 5 E9/L (ref 4.5–11.5)
WBC # BLD: 5 E9/L (ref 4.5–11.5)
WBC # BLD: 5.3 E9/L (ref 4.5–11.5)
WBC # BLD: 5.6 E9/L (ref 4.5–11.5)
WBC # BLD: 6.5 E9/L (ref 4.5–11.5)
WBC # BLD: 7.8 E9/L (ref 4.5–11.5)
WBC # BLD: 7.9 E9/L (ref 4.5–11.5)
WBC UA: ABNORMAL /HPF (ref 0–5)
WBC UA: ABNORMAL /HPF (ref 0–5)

## 2018-01-01 PROCEDURE — 6360000002 HC RX W HCPCS: Performed by: INTERNAL MEDICINE

## 2018-01-01 PROCEDURE — 86901 BLOOD TYPING SEROLOGIC RH(D): CPT

## 2018-01-01 PROCEDURE — 80048 BASIC METABOLIC PNL TOTAL CA: CPT

## 2018-01-01 PROCEDURE — 36415 COLL VENOUS BLD VENIPUNCTURE: CPT

## 2018-01-01 PROCEDURE — 2580000003 HC RX 258: Performed by: INTERNAL MEDICINE

## 2018-01-01 PROCEDURE — 97530 THERAPEUTIC ACTIVITIES: CPT

## 2018-01-01 PROCEDURE — 6370000000 HC RX 637 (ALT 250 FOR IP): Performed by: INTERNAL MEDICINE

## 2018-01-01 PROCEDURE — 86900 BLOOD TYPING SEROLOGIC ABO: CPT

## 2018-01-01 PROCEDURE — 86850 RBC ANTIBODY SCREEN: CPT

## 2018-01-01 PROCEDURE — 84484 ASSAY OF TROPONIN QUANT: CPT

## 2018-01-01 PROCEDURE — C9113 INJ PANTOPRAZOLE SODIUM, VIA: HCPCS | Performed by: INTERNAL MEDICINE

## 2018-01-01 PROCEDURE — 85610 PROTHROMBIN TIME: CPT

## 2018-01-01 PROCEDURE — 97166 OT EVAL MOD COMPLEX 45 MIN: CPT

## 2018-01-01 PROCEDURE — 2700000000 HC OXYGEN THERAPY PER DAY

## 2018-01-01 PROCEDURE — G8982 BODY POS GOAL STATUS: HCPCS

## 2018-01-01 PROCEDURE — 6370000000 HC RX 637 (ALT 250 FOR IP): Performed by: STUDENT IN AN ORGANIZED HEALTH CARE EDUCATION/TRAINING PROGRAM

## 2018-01-01 PROCEDURE — 85576 BLOOD PLATELET AGGREGATION: CPT

## 2018-01-01 PROCEDURE — 1200000000 HC SEMI PRIVATE

## 2018-01-01 PROCEDURE — G8988 SELF CARE GOAL STATUS: HCPCS

## 2018-01-01 PROCEDURE — 81003 URINALYSIS AUTO W/O SCOPE: CPT

## 2018-01-01 PROCEDURE — 99233 SBSQ HOSP IP/OBS HIGH 50: CPT | Performed by: EMERGENCY MEDICINE

## 2018-01-01 PROCEDURE — 85347 COAGULATION TIME ACTIVATED: CPT

## 2018-01-01 PROCEDURE — 85025 COMPLETE CBC W/AUTO DIFF WBC: CPT

## 2018-01-01 PROCEDURE — 99285 EMERGENCY DEPT VISIT HI MDM: CPT

## 2018-01-01 PROCEDURE — 93005 ELECTROCARDIOGRAM TRACING: CPT | Performed by: EMERGENCY MEDICINE

## 2018-01-01 PROCEDURE — 81001 URINALYSIS AUTO W/SCOPE: CPT

## 2018-01-01 PROCEDURE — 80053 COMPREHEN METABOLIC PANEL: CPT

## 2018-01-01 PROCEDURE — 2140000000 HC CCU INTERMEDIATE R&B

## 2018-01-01 PROCEDURE — P9035 PLATELET PHERES LEUKOREDUCED: HCPCS

## 2018-01-01 PROCEDURE — 99232 SBSQ HOSP IP/OBS MODERATE 35: CPT | Performed by: NEUROLOGICAL SURGERY

## 2018-01-01 PROCEDURE — 85384 FIBRINOGEN ACTIVITY: CPT

## 2018-01-01 PROCEDURE — 84443 ASSAY THYROID STIM HORMONE: CPT

## 2018-01-01 PROCEDURE — 83735 ASSAY OF MAGNESIUM: CPT

## 2018-01-01 PROCEDURE — 85027 COMPLETE CBC AUTOMATED: CPT

## 2018-01-01 PROCEDURE — 87486 CHLMYD PNEUM DNA AMP PROBE: CPT

## 2018-01-01 PROCEDURE — 70450 CT HEAD/BRAIN W/O DYE: CPT

## 2018-01-01 PROCEDURE — G8978 MOBILITY CURRENT STATUS: HCPCS

## 2018-01-01 PROCEDURE — 6370000000 HC RX 637 (ALT 250 FOR IP)

## 2018-01-01 PROCEDURE — G8987 SELF CARE CURRENT STATUS: HCPCS

## 2018-01-01 PROCEDURE — C9132 KCENTRA, PER I.U.: HCPCS | Performed by: EMERGENCY MEDICINE

## 2018-01-01 PROCEDURE — 87088 URINE BACTERIA CULTURE: CPT

## 2018-01-01 PROCEDURE — 97162 PT EVAL MOD COMPLEX 30 MIN: CPT

## 2018-01-01 PROCEDURE — 6360000002 HC RX W HCPCS: Performed by: EMERGENCY MEDICINE

## 2018-01-01 PROCEDURE — G8981 BODY POS CURRENT STATUS: HCPCS

## 2018-01-01 PROCEDURE — 74176 CT ABD & PELVIS W/O CONTRAST: CPT

## 2018-01-01 PROCEDURE — 87186 SC STD MICRODIL/AGAR DIL: CPT

## 2018-01-01 PROCEDURE — 82550 ASSAY OF CK (CPK): CPT

## 2018-01-01 PROCEDURE — 71250 CT THORAX DX C-: CPT

## 2018-01-01 PROCEDURE — 2500000003 HC RX 250 WO HCPCS: Performed by: INTERNAL MEDICINE

## 2018-01-01 PROCEDURE — 99223 1ST HOSP IP/OBS HIGH 75: CPT | Performed by: SURGERY

## 2018-01-01 PROCEDURE — 80076 HEPATIC FUNCTION PANEL: CPT

## 2018-01-01 PROCEDURE — 87798 DETECT AGENT NOS DNA AMP: CPT

## 2018-01-01 PROCEDURE — 85730 THROMBOPLASTIN TIME PARTIAL: CPT

## 2018-01-01 PROCEDURE — 99284 EMERGENCY DEPT VISIT MOD MDM: CPT

## 2018-01-01 PROCEDURE — 83605 ASSAY OF LACTIC ACID: CPT

## 2018-01-01 PROCEDURE — 82150 ASSAY OF AMYLASE: CPT

## 2018-01-01 PROCEDURE — 83880 ASSAY OF NATRIURETIC PEPTIDE: CPT

## 2018-01-01 PROCEDURE — 84132 ASSAY OF SERUM POTASSIUM: CPT

## 2018-01-01 PROCEDURE — 82962 GLUCOSE BLOOD TEST: CPT

## 2018-01-01 PROCEDURE — 82553 CREATINE MB FRACTION: CPT

## 2018-01-01 PROCEDURE — 36430 TRANSFUSION BLD/BLD COMPNT: CPT

## 2018-01-01 PROCEDURE — 97161 PT EVAL LOW COMPLEX 20 MIN: CPT

## 2018-01-01 PROCEDURE — P9016 RBC LEUKOCYTES REDUCED: HCPCS

## 2018-01-01 PROCEDURE — 70486 CT MAXILLOFACIAL W/O DYE: CPT

## 2018-01-01 PROCEDURE — 71045 X-RAY EXAM CHEST 1 VIEW: CPT

## 2018-01-01 PROCEDURE — 99222 1ST HOSP IP/OBS MODERATE 55: CPT | Performed by: NEUROLOGICAL SURGERY

## 2018-01-01 PROCEDURE — 99221 1ST HOSP IP/OBS SF/LOW 40: CPT | Performed by: PSYCHIATRY & NEUROLOGY

## 2018-01-01 PROCEDURE — 99223 1ST HOSP IP/OBS HIGH 75: CPT | Performed by: CLINICAL NURSE SPECIALIST

## 2018-01-01 PROCEDURE — 83690 ASSAY OF LIPASE: CPT

## 2018-01-01 PROCEDURE — 99231 SBSQ HOSP IP/OBS SF/LOW 25: CPT | Performed by: SURGERY

## 2018-01-01 PROCEDURE — 97165 OT EVAL LOW COMPLEX 30 MIN: CPT

## 2018-01-01 PROCEDURE — 99232 SBSQ HOSP IP/OBS MODERATE 35: CPT | Performed by: CLINICAL NURSE SPECIALIST

## 2018-01-01 PROCEDURE — 84439 ASSAY OF FREE THYROXINE: CPT

## 2018-01-01 PROCEDURE — 2580000003 HC RX 258: Performed by: EMERGENCY MEDICINE

## 2018-01-01 PROCEDURE — G8979 MOBILITY GOAL STATUS: HCPCS

## 2018-01-01 PROCEDURE — 99233 SBSQ HOSP IP/OBS HIGH 50: CPT | Performed by: CLINICAL NURSE SPECIALIST

## 2018-01-01 PROCEDURE — 87077 CULTURE AEROBIC IDENTIFY: CPT

## 2018-01-01 PROCEDURE — 2580000003 HC RX 258: Performed by: STUDENT IN AN ORGANIZED HEALTH CARE EDUCATION/TRAINING PROGRAM

## 2018-01-01 PROCEDURE — 87633 RESP VIRUS 12-25 TARGETS: CPT

## 2018-01-01 PROCEDURE — 87581 M.PNEUMON DNA AMP PROBE: CPT

## 2018-01-01 PROCEDURE — 87040 BLOOD CULTURE FOR BACTERIA: CPT

## 2018-01-01 PROCEDURE — 92611 MOTION FLUOROSCOPY/SWALLOW: CPT

## 2018-01-01 PROCEDURE — 86923 COMPATIBILITY TEST ELECTRIC: CPT

## 2018-01-01 PROCEDURE — 99232 SBSQ HOSP IP/OBS MODERATE 35: CPT | Performed by: SURGERY

## 2018-01-01 RX ORDER — LEVOTHYROXINE SODIUM 0.05 MG/1
50 TABLET ORAL DAILY
Status: DISCONTINUED | OUTPATIENT
Start: 2018-01-01 | End: 2018-01-01 | Stop reason: HOSPADM

## 2018-01-01 RX ORDER — MORPHINE SULFATE 20 MG/ML
5 SOLUTION ORAL EVERY 4 HOURS PRN
Status: DISCONTINUED | OUTPATIENT
Start: 2018-01-01 | End: 2018-01-01

## 2018-01-01 RX ORDER — SODIUM CHLORIDE 0.9 % (FLUSH) 0.9 %
10 SYRINGE (ML) INJECTION EVERY 12 HOURS SCHEDULED
Status: CANCELLED | OUTPATIENT
Start: 2018-01-01

## 2018-01-01 RX ORDER — ONDANSETRON 2 MG/ML
4 INJECTION INTRAMUSCULAR; INTRAVENOUS EVERY 6 HOURS PRN
Status: DISCONTINUED | OUTPATIENT
Start: 2018-01-01 | End: 2018-01-01 | Stop reason: HOSPADM

## 2018-01-01 RX ORDER — ACETAMINOPHEN 500 MG
500 TABLET ORAL EVERY 6 HOURS PRN
Status: DISCONTINUED | OUTPATIENT
Start: 2018-01-01 | End: 2018-01-01 | Stop reason: HOSPADM

## 2018-01-01 RX ORDER — 0.9 % SODIUM CHLORIDE 0.9 %
250 INTRAVENOUS SOLUTION INTRAVENOUS ONCE
Status: DISCONTINUED | OUTPATIENT
Start: 2018-01-01 | End: 2018-01-01

## 2018-01-01 RX ORDER — SODIUM CHLORIDE 450 MG/100ML
INJECTION, SOLUTION INTRAVENOUS CONTINUOUS
Status: DISCONTINUED | OUTPATIENT
Start: 2018-01-01 | End: 2018-01-01

## 2018-01-01 RX ORDER — SODIUM CHLORIDE 9 MG/ML
INJECTION, SOLUTION INTRAVENOUS CONTINUOUS
Status: DISCONTINUED | OUTPATIENT
Start: 2018-01-01 | End: 2018-01-01 | Stop reason: HOSPADM

## 2018-01-01 RX ORDER — 0.9 % SODIUM CHLORIDE 0.9 %
250 INTRAVENOUS SOLUTION INTRAVENOUS ONCE
Status: COMPLETED | OUTPATIENT
Start: 2018-01-01 | End: 2018-01-01

## 2018-01-01 RX ORDER — FAMOTIDINE 20 MG/1
20 TABLET, FILM COATED ORAL DAILY
Status: DISCONTINUED | OUTPATIENT
Start: 2018-01-01 | End: 2018-01-01 | Stop reason: HOSPADM

## 2018-01-01 RX ORDER — 0.9 % SODIUM CHLORIDE 0.9 %
1000 INTRAVENOUS SOLUTION INTRAVENOUS ONCE
Status: COMPLETED | OUTPATIENT
Start: 2018-01-01 | End: 2018-01-01

## 2018-01-01 RX ORDER — SODIUM CHLORIDE 0.9 % (FLUSH) 0.9 %
10 SYRINGE (ML) INJECTION PRN
Status: CANCELLED | OUTPATIENT
Start: 2018-01-01

## 2018-01-01 RX ORDER — SODIUM CHLORIDE 0.9 % (FLUSH) 0.9 %
10 SYRINGE (ML) INJECTION EVERY 12 HOURS SCHEDULED
Status: DISCONTINUED | OUTPATIENT
Start: 2018-01-01 | End: 2018-01-01 | Stop reason: HOSPADM

## 2018-01-01 RX ORDER — ACETAMINOPHEN 325 MG/1
650 TABLET ORAL EVERY 4 HOURS PRN
Qty: 120 TABLET | Refills: 3 | DISCHARGE
Start: 2018-01-01

## 2018-01-01 RX ORDER — DEXTROSE, SODIUM CHLORIDE, AND POTASSIUM CHLORIDE 5; .45; .15 G/100ML; G/100ML; G/100ML
INJECTION INTRAVENOUS CONTINUOUS
Status: DISCONTINUED | OUTPATIENT
Start: 2018-01-01 | End: 2018-01-01

## 2018-01-01 RX ORDER — POTASSIUM CHLORIDE 20 MEQ/1
40 TABLET, EXTENDED RELEASE ORAL 2 TIMES DAILY
Status: DISCONTINUED | OUTPATIENT
Start: 2018-01-01 | End: 2018-01-01 | Stop reason: HOSPADM

## 2018-01-01 RX ORDER — ACETAMINOPHEN 325 MG/1
650 TABLET ORAL SEE ADMIN INSTRUCTIONS
Status: DISCONTINUED | OUTPATIENT
Start: 2018-01-01 | End: 2018-01-01

## 2018-01-01 RX ORDER — MORPHINE SULFATE 2 MG/ML
1 INJECTION, SOLUTION INTRAMUSCULAR; INTRAVENOUS EVERY 4 HOURS PRN
Status: DISCONTINUED | OUTPATIENT
Start: 2018-01-01 | End: 2018-01-01

## 2018-01-01 RX ORDER — MORPHINE SULFATE 10 MG/5ML
5 SOLUTION ORAL ONCE
Status: COMPLETED | OUTPATIENT
Start: 2018-01-01 | End: 2018-01-01

## 2018-01-01 RX ORDER — POTASSIUM CHLORIDE 750 MG/1
10 TABLET, EXTENDED RELEASE ORAL DAILY
Status: DISCONTINUED | OUTPATIENT
Start: 2018-01-01 | End: 2018-01-01 | Stop reason: HOSPADM

## 2018-01-01 RX ORDER — FAMOTIDINE 20 MG/1
20 TABLET, FILM COATED ORAL 2 TIMES DAILY
Status: DISCONTINUED | OUTPATIENT
Start: 2018-01-01 | End: 2018-01-01 | Stop reason: SDUPTHER

## 2018-01-01 RX ORDER — ACETAMINOPHEN 650 MG/1
SUPPOSITORY RECTAL
Status: COMPLETED
Start: 2018-01-01 | End: 2018-01-01

## 2018-01-01 RX ORDER — POTASSIUM CHLORIDE 1.5 G/1.77G
40 POWDER, FOR SOLUTION ORAL DAILY
Qty: 30 EACH | Refills: 3 | DISCHARGE
Start: 2018-01-01

## 2018-01-01 RX ORDER — FUROSEMIDE 20 MG/1
20 TABLET ORAL DAILY
Status: DISCONTINUED | OUTPATIENT
Start: 2018-01-01 | End: 2018-01-01 | Stop reason: HOSPADM

## 2018-01-01 RX ORDER — LEVOTHYROXINE SODIUM 0.05 MG/1
50 TABLET ORAL DAILY
Status: DISCONTINUED | OUTPATIENT
Start: 2018-01-01 | End: 2018-01-01

## 2018-01-01 RX ORDER — PANTOPRAZOLE SODIUM 40 MG/10ML
40 INJECTION, POWDER, LYOPHILIZED, FOR SOLUTION INTRAVENOUS DAILY
Status: DISCONTINUED | OUTPATIENT
Start: 2018-01-01 | End: 2018-10-01 | Stop reason: HOSPADM

## 2018-01-01 RX ORDER — SIMVASTATIN 20 MG
20 TABLET ORAL NIGHTLY
Status: DISCONTINUED | OUTPATIENT
Start: 2018-01-01 | End: 2018-01-01 | Stop reason: HOSPADM

## 2018-01-01 RX ORDER — SODIUM CHLORIDE 0.9 % (FLUSH) 0.9 %
10 SYRINGE (ML) INJECTION PRN
Status: DISCONTINUED | OUTPATIENT
Start: 2018-01-01 | End: 2018-01-01 | Stop reason: HOSPADM

## 2018-01-01 RX ORDER — PETROLATUM 42 G/100G
OINTMENT TOPICAL 3 TIMES DAILY
Status: DISCONTINUED | OUTPATIENT
Start: 2018-01-01 | End: 2018-10-01 | Stop reason: HOSPADM

## 2018-01-01 RX ORDER — POTASSIUM CHLORIDE 20 MEQ/1
40 TABLET, EXTENDED RELEASE ORAL PRN
Status: DISCONTINUED | OUTPATIENT
Start: 2018-01-01 | End: 2018-01-01 | Stop reason: HOSPADM

## 2018-01-01 RX ORDER — LEVETIRACETAM 500 MG/1
500 TABLET ORAL 2 TIMES DAILY
Status: DISCONTINUED | OUTPATIENT
Start: 2018-01-01 | End: 2018-01-01 | Stop reason: HOSPADM

## 2018-01-01 RX ORDER — ACETAMINOPHEN 325 MG/1
650 TABLET ORAL EVERY 4 HOURS PRN
Status: CANCELLED | OUTPATIENT
Start: 2018-01-01

## 2018-01-01 RX ORDER — GLYCOPYRROLATE 0.2 MG/ML
0.2 INJECTION INTRAMUSCULAR; INTRAVENOUS EVERY 4 HOURS PRN
Status: DISCONTINUED | OUTPATIENT
Start: 2018-01-01 | End: 2018-10-01 | Stop reason: HOSPADM

## 2018-01-01 RX ORDER — DIPHENHYDRAMINE HCL 25 MG
25 TABLET ORAL SEE ADMIN INSTRUCTIONS
Status: DISCONTINUED | OUTPATIENT
Start: 2018-01-01 | End: 2018-01-01

## 2018-01-01 RX ORDER — 0.9 % SODIUM CHLORIDE 0.9 %
250 INTRAVENOUS SOLUTION INTRAVENOUS ONCE
Status: DISCONTINUED | OUTPATIENT
Start: 2018-01-01 | End: 2018-01-01 | Stop reason: HOSPADM

## 2018-01-01 RX ORDER — FUROSEMIDE 20 MG/1
20 TABLET ORAL DAILY
COMMUNITY

## 2018-01-01 RX ORDER — PETROLATUM 42 G/100G
OINTMENT TOPICAL 3 TIMES DAILY PRN
Status: DISCONTINUED | OUTPATIENT
Start: 2018-01-01 | End: 2018-10-01 | Stop reason: HOSPADM

## 2018-01-01 RX ORDER — ONDANSETRON 2 MG/ML
4 INJECTION INTRAMUSCULAR; INTRAVENOUS EVERY 6 HOURS PRN
Status: DISCONTINUED | OUTPATIENT
Start: 2018-01-01 | End: 2018-10-01 | Stop reason: HOSPADM

## 2018-01-01 RX ORDER — DEXTROSE MONOHYDRATE 50 MG/ML
INJECTION, SOLUTION INTRAVENOUS CONTINUOUS
Status: DISCONTINUED | OUTPATIENT
Start: 2018-01-01 | End: 2018-10-01 | Stop reason: HOSPADM

## 2018-01-01 RX ORDER — MORPHINE SULFATE 4 MG/ML
4 INJECTION, SOLUTION INTRAMUSCULAR; INTRAVENOUS EVERY 4 HOURS PRN
Status: DISCONTINUED | OUTPATIENT
Start: 2018-01-01 | End: 2018-10-01 | Stop reason: HOSPADM

## 2018-01-01 RX ORDER — MORPHINE SULFATE 2 MG/ML
2 INJECTION, SOLUTION INTRAMUSCULAR; INTRAVENOUS EVERY 4 HOURS PRN
Status: DISCONTINUED | OUTPATIENT
Start: 2018-01-01 | End: 2018-10-01 | Stop reason: HOSPADM

## 2018-01-01 RX ORDER — SODIUM CHLORIDE 0.9 % (FLUSH) 0.9 %
10 SYRINGE (ML) INJECTION EVERY 12 HOURS SCHEDULED
Status: DISCONTINUED | OUTPATIENT
Start: 2018-01-01 | End: 2018-10-01 | Stop reason: HOSPADM

## 2018-01-01 RX ORDER — METOPROLOL TARTRATE 5 MG/5ML
5 INJECTION INTRAVENOUS EVERY 6 HOURS
Status: DISCONTINUED | OUTPATIENT
Start: 2018-01-01 | End: 2018-10-01 | Stop reason: HOSPADM

## 2018-01-01 RX ORDER — FOLIC ACID 1 MG/1
1 TABLET ORAL DAILY
Status: DISCONTINUED | OUTPATIENT
Start: 2018-01-01 | End: 2018-01-01

## 2018-01-01 RX ORDER — FOLIC ACID 1 MG/1
1 TABLET ORAL DAILY
Refills: 3 | COMMUNITY
Start: 2018-01-01

## 2018-01-01 RX ORDER — POTASSIUM CHLORIDE 7.45 MG/ML
10 INJECTION INTRAVENOUS PRN
Status: DISCONTINUED | OUTPATIENT
Start: 2018-01-01 | End: 2018-01-01 | Stop reason: HOSPADM

## 2018-01-01 RX ORDER — METOPROLOL TARTRATE 50 MG/1
50 TABLET, FILM COATED ORAL 2 TIMES DAILY
Qty: 60 TABLET | Refills: 3 | DISCHARGE
Start: 2018-01-01

## 2018-01-01 RX ORDER — ALLOPURINOL 100 MG/1
100 TABLET ORAL DAILY
Status: DISCONTINUED | OUTPATIENT
Start: 2018-01-01 | End: 2018-01-01

## 2018-01-01 RX ORDER — ACETAMINOPHEN 325 MG/1
650 TABLET ORAL EVERY 4 HOURS PRN
Status: DISCONTINUED | OUTPATIENT
Start: 2018-01-01 | End: 2018-01-01 | Stop reason: HOSPADM

## 2018-01-01 RX ORDER — SODIUM CHLORIDE 0.9 % (FLUSH) 0.9 %
10 SYRINGE (ML) INJECTION PRN
Status: DISCONTINUED | OUTPATIENT
Start: 2018-01-01 | End: 2018-10-01 | Stop reason: HOSPADM

## 2018-01-01 RX ORDER — MORPHINE SULFATE 2 MG/ML
1 INJECTION, SOLUTION INTRAMUSCULAR; INTRAVENOUS ONCE
Status: COMPLETED | OUTPATIENT
Start: 2018-01-01 | End: 2018-01-01

## 2018-01-01 RX ORDER — ACETAMINOPHEN 325 MG/1
650 TABLET ORAL EVERY 4 HOURS PRN
Status: DISCONTINUED | OUTPATIENT
Start: 2018-01-01 | End: 2018-10-01 | Stop reason: HOSPADM

## 2018-01-01 RX ORDER — FOLIC ACID 1 MG/1
1 TABLET ORAL DAILY
Status: DISCONTINUED | OUTPATIENT
Start: 2018-01-01 | End: 2018-01-01 | Stop reason: HOSPADM

## 2018-01-01 RX ORDER — LORAZEPAM 2 MG/ML
0.5 INJECTION INTRAMUSCULAR EVERY 4 HOURS PRN
Status: DISCONTINUED | OUTPATIENT
Start: 2018-01-01 | End: 2018-10-01 | Stop reason: HOSPADM

## 2018-01-01 RX ORDER — LANOLIN ALCOHOL/MO/W.PET/CERES
1000 CREAM (GRAM) TOPICAL DAILY
Status: DISCONTINUED | OUTPATIENT
Start: 2018-01-01 | End: 2018-01-01

## 2018-01-01 RX ORDER — ACETAMINOPHEN 650 MG/1
650 SUPPOSITORY RECTAL ONCE
Status: COMPLETED | OUTPATIENT
Start: 2018-01-01 | End: 2018-01-01

## 2018-01-01 RX ORDER — DIPHENHYDRAMINE HYDROCHLORIDE 50 MG/ML
25 INJECTION INTRAMUSCULAR; INTRAVENOUS ONCE
Status: COMPLETED | OUTPATIENT
Start: 2018-01-01 | End: 2018-01-01

## 2018-01-01 RX ORDER — SODIUM CHLORIDE 9 MG/ML
INJECTION, SOLUTION INTRAVENOUS CONTINUOUS
Status: DISCONTINUED | OUTPATIENT
Start: 2018-01-01 | End: 2018-01-01 | Stop reason: SDUPTHER

## 2018-01-01 RX ORDER — FAMOTIDINE 20 MG/1
20 TABLET, FILM COATED ORAL DAILY
Status: DISCONTINUED | OUTPATIENT
Start: 2018-01-01 | End: 2018-01-01

## 2018-01-01 RX ORDER — ALLOPURINOL 100 MG/1
100 TABLET ORAL DAILY
Status: DISCONTINUED | OUTPATIENT
Start: 2018-01-01 | End: 2018-01-01 | Stop reason: HOSPADM

## 2018-01-01 RX ORDER — METOPROLOL TARTRATE 5 MG/5ML
2.5 INJECTION INTRAVENOUS EVERY 8 HOURS
Status: DISCONTINUED | OUTPATIENT
Start: 2018-01-01 | End: 2018-01-01

## 2018-01-01 RX ORDER — POTASSIUM CHLORIDE 1.5 G/1.77G
40 POWDER, FOR SOLUTION ORAL 2 TIMES DAILY
Status: DISCONTINUED | OUTPATIENT
Start: 2018-01-01 | End: 2018-01-01

## 2018-01-01 RX ORDER — POTASSIUM CHLORIDE 750 MG/1
1 TABLET, EXTENDED RELEASE ORAL DAILY
Refills: 3 | Status: ON HOLD | COMMUNITY
Start: 2018-01-01 | End: 2018-01-01 | Stop reason: HOSPADM

## 2018-01-01 RX ORDER — SIMVASTATIN 20 MG
20 TABLET ORAL NIGHTLY
Status: DISCONTINUED | OUTPATIENT
Start: 2018-01-01 | End: 2018-01-01

## 2018-01-01 RX ORDER — MORPHINE SULFATE 20 MG/ML
10 SOLUTION ORAL
Status: DISCONTINUED | OUTPATIENT
Start: 2018-01-01 | End: 2018-10-01 | Stop reason: HOSPADM

## 2018-01-01 RX ORDER — POTASSIUM CHLORIDE 20MEQ/15ML
40 LIQUID (ML) ORAL PRN
Status: DISCONTINUED | OUTPATIENT
Start: 2018-01-01 | End: 2018-01-01 | Stop reason: HOSPADM

## 2018-01-01 RX ORDER — METOPROLOL TARTRATE 50 MG/1
50 TABLET, FILM COATED ORAL 2 TIMES DAILY
Status: DISCONTINUED | OUTPATIENT
Start: 2018-01-01 | End: 2018-01-01 | Stop reason: HOSPADM

## 2018-01-01 RX ORDER — ACETAMINOPHEN 650 MG/1
650 SUPPOSITORY RECTAL EVERY 4 HOURS PRN
Status: DISCONTINUED | OUTPATIENT
Start: 2018-01-01 | End: 2018-10-01 | Stop reason: HOSPADM

## 2018-01-01 RX ADMIN — SODIUM CHLORIDE 1000 ML: 9 INJECTION, SOLUTION INTRAVENOUS at 00:50

## 2018-01-01 RX ADMIN — LEVOTHYROXINE SODIUM 50 MCG: 50 TABLET ORAL at 21:58

## 2018-01-01 RX ADMIN — POTASSIUM CHLORIDE 10 MEQ: 10 TABLET, EXTENDED RELEASE ORAL at 09:38

## 2018-01-01 RX ADMIN — LEVOTHYROXINE SODIUM 50 MCG: 50 TABLET ORAL at 08:44

## 2018-01-01 RX ADMIN — METOPROLOL TARTRATE 50 MG: 50 TABLET, FILM COATED ORAL at 20:01

## 2018-01-01 RX ADMIN — LEVOTHYROXINE SODIUM 50 MCG: 50 TABLET ORAL at 06:25

## 2018-01-01 RX ADMIN — PANTOPRAZOLE SODIUM 40 MG: 40 INJECTION, POWDER, FOR SOLUTION INTRAVENOUS at 14:48

## 2018-01-01 RX ADMIN — ACETAMINOPHEN 650 MG: 650 SUPPOSITORY RECTAL at 08:34

## 2018-01-01 RX ADMIN — Medication 10 ML: at 21:00

## 2018-01-01 RX ADMIN — WATER 2 G: 1 INJECTION INTRAMUSCULAR; INTRAVENOUS; SUBCUTANEOUS at 14:22

## 2018-01-01 RX ADMIN — POTASSIUM CHLORIDE 10 MEQ: 10 TABLET, EXTENDED RELEASE ORAL at 09:47

## 2018-01-01 RX ADMIN — MORPHINE SULFATE 1 MG: 2 INJECTION, SOLUTION INTRAMUSCULAR; INTRAVENOUS at 18:18

## 2018-01-01 RX ADMIN — MORPHINE SULFATE 1 MG: 2 INJECTION, SOLUTION INTRAMUSCULAR; INTRAVENOUS at 17:29

## 2018-01-01 RX ADMIN — ACETAMINOPHEN 650 MG: 650 SUPPOSITORY RECTAL at 05:55

## 2018-01-01 RX ADMIN — ACETAMINOPHEN 650 MG: 650 SUPPOSITORY RECTAL at 04:42

## 2018-01-01 RX ADMIN — SIMVASTATIN 20 MG: 20 TABLET, FILM COATED ORAL at 22:07

## 2018-01-01 RX ADMIN — LEVOTHYROXINE SODIUM 50 MCG: 50 TABLET ORAL at 07:20

## 2018-01-01 RX ADMIN — SIMVASTATIN 20 MG: 20 TABLET, FILM COATED ORAL at 21:09

## 2018-01-01 RX ADMIN — METOPROLOL TARTRATE 5 MG: 1 INJECTION, SOLUTION INTRAVENOUS at 23:02

## 2018-01-01 RX ADMIN — ACETAMINOPHEN 650 MG: 650 SUPPOSITORY RECTAL at 13:01

## 2018-01-01 RX ADMIN — ALLOPURINOL 100 MG: 100 TABLET ORAL at 21:58

## 2018-01-01 RX ADMIN — Medication 10 ML: at 07:42

## 2018-01-01 RX ADMIN — Medication 10 ML: at 21:58

## 2018-01-01 RX ADMIN — METOPROLOL TARTRATE 5 MG: 1 INJECTION, SOLUTION INTRAVENOUS at 11:30

## 2018-01-01 RX ADMIN — MORPHINE SULFATE 1 MG: 2 INJECTION, SOLUTION INTRAMUSCULAR; INTRAVENOUS at 00:32

## 2018-01-01 RX ADMIN — FOLIC ACID 1 MG: 1 TABLET ORAL at 09:39

## 2018-01-01 RX ADMIN — SODIUM CHLORIDE: 4.5 INJECTION, SOLUTION INTRAVENOUS at 21:31

## 2018-01-01 RX ADMIN — PANTOPRAZOLE SODIUM 40 MG: 40 INJECTION, POWDER, FOR SOLUTION INTRAVENOUS at 08:57

## 2018-01-01 RX ADMIN — Medication 10 ML: at 09:48

## 2018-01-01 RX ADMIN — MORPHINE SULFATE 1 MG: 2 INJECTION, SOLUTION INTRAMUSCULAR; INTRAVENOUS at 11:30

## 2018-01-01 RX ADMIN — METOPROLOL TARTRATE 25 MG: 25 TABLET ORAL at 08:44

## 2018-01-01 RX ADMIN — LEVOTHYROXINE SODIUM 50 MCG: 50 TABLET ORAL at 06:32

## 2018-01-01 RX ADMIN — METOPROLOL TARTRATE 50 MG: 50 TABLET, FILM COATED ORAL at 09:47

## 2018-01-01 RX ADMIN — SODIUM CHLORIDE 1000 ML: 9 INJECTION, SOLUTION INTRAVENOUS at 19:47

## 2018-01-01 RX ADMIN — LEVETIRACETAM 500 MG: 500 TABLET, FILM COATED ORAL at 22:21

## 2018-01-01 RX ADMIN — METOPROLOL TARTRATE 5 MG: 1 INJECTION, SOLUTION INTRAVENOUS at 17:20

## 2018-01-01 RX ADMIN — DEXTROSE, SODIUM CHLORIDE, AND POTASSIUM CHLORIDE: 5; .45; .15 INJECTION INTRAVENOUS at 23:00

## 2018-01-01 RX ADMIN — DEXTROSE MONOHYDRATE: 50 INJECTION, SOLUTION INTRAVENOUS at 13:26

## 2018-01-01 RX ADMIN — SODIUM CHLORIDE 250 ML: 9 INJECTION, SOLUTION INTRAVENOUS at 14:13

## 2018-01-01 RX ADMIN — POTASSIUM CHLORIDE: 2 INJECTION, SOLUTION, CONCENTRATE INTRAVENOUS at 14:50

## 2018-01-01 RX ADMIN — PETROLATUM: 42 OINTMENT TOPICAL at 22:57

## 2018-01-01 RX ADMIN — MORPHINE SULFATE 1 MG: 2 INJECTION, SOLUTION INTRAMUSCULAR; INTRAVENOUS at 06:47

## 2018-01-01 RX ADMIN — LORAZEPAM 0.5 MG: 2 INJECTION INTRAMUSCULAR; INTRAVENOUS at 05:25

## 2018-01-01 RX ADMIN — METOPROLOL TARTRATE 5 MG: 1 INJECTION, SOLUTION INTRAVENOUS at 05:25

## 2018-01-01 RX ADMIN — Medication 10 ML: at 09:42

## 2018-01-01 RX ADMIN — FOLIC ACID 1 MG: 1 TABLET ORAL at 08:44

## 2018-01-01 RX ADMIN — SODIUM CHLORIDE 250 ML: 9 INJECTION, SOLUTION INTRAVENOUS at 11:10

## 2018-01-01 RX ADMIN — METOPROLOL TARTRATE 12.5 MG: 25 TABLET ORAL at 22:07

## 2018-01-01 RX ADMIN — PANTOPRAZOLE SODIUM 40 MG: 40 INJECTION, POWDER, FOR SOLUTION INTRAVENOUS at 16:52

## 2018-01-01 RX ADMIN — WATER 2 G: 1 INJECTION INTRAMUSCULAR; INTRAVENOUS; SUBCUTANEOUS at 11:56

## 2018-01-01 RX ADMIN — LEVETIRACETAM 500 MG: 500 TABLET, FILM COATED ORAL at 00:47

## 2018-01-01 RX ADMIN — MORPHINE SULFATE 2 MG: 2 INJECTION, SOLUTION INTRAMUSCULAR; INTRAVENOUS at 19:31

## 2018-01-01 RX ADMIN — METOPROLOL TARTRATE 5 MG: 1 INJECTION, SOLUTION INTRAVENOUS at 17:41

## 2018-01-01 RX ADMIN — ACETAMINOPHEN 650 MG: 650 SUPPOSITORY RECTAL at 08:05

## 2018-01-01 RX ADMIN — POTASSIUM CHLORIDE: 2 INJECTION, SOLUTION, CONCENTRATE INTRAVENOUS at 21:14

## 2018-01-01 RX ADMIN — SIMVASTATIN 20 MG: 20 TABLET, FILM COATED ORAL at 22:21

## 2018-01-01 RX ADMIN — MORPHINE SULFATE 2 MG: 2 INJECTION, SOLUTION INTRAMUSCULAR; INTRAVENOUS at 13:01

## 2018-01-01 RX ADMIN — FUROSEMIDE 20 MG: 20 TABLET ORAL at 09:47

## 2018-01-01 RX ADMIN — SIMVASTATIN 20 MG: 20 TABLET, FILM COATED ORAL at 20:02

## 2018-01-01 RX ADMIN — METOPROLOL TARTRATE 12.5 MG: 25 TABLET ORAL at 22:22

## 2018-01-01 RX ADMIN — Medication 10 ML: at 13:02

## 2018-01-01 RX ADMIN — DEXTROSE MONOHYDRATE: 50 INJECTION, SOLUTION INTRAVENOUS at 16:30

## 2018-01-01 RX ADMIN — FOLIC ACID 1 MG: 1 TABLET ORAL at 09:47

## 2018-01-01 RX ADMIN — DEXTROSE MONOHYDRATE: 50 INJECTION, SOLUTION INTRAVENOUS at 03:54

## 2018-01-01 RX ADMIN — METOPROLOL TARTRATE 5 MG: 1 INJECTION, SOLUTION INTRAVENOUS at 14:23

## 2018-01-01 RX ADMIN — DIPHENHYDRAMINE HYDROCHLORIDE 25 MG: 50 INJECTION INTRAMUSCULAR; INTRAVENOUS at 08:05

## 2018-01-01 RX ADMIN — ACETAMINOPHEN 650 MG: 650 SUPPOSITORY RECTAL at 21:43

## 2018-01-01 RX ADMIN — METOPROLOL TARTRATE 25 MG: 25 TABLET, FILM COATED ORAL at 10:45

## 2018-01-01 RX ADMIN — METOPROLOL TARTRATE 5 MG: 1 INJECTION, SOLUTION INTRAVENOUS at 05:58

## 2018-01-01 RX ADMIN — METOPROLOL TARTRATE 12.5 MG: 25 TABLET ORAL at 09:40

## 2018-01-01 RX ADMIN — Medication 10 ML: at 22:21

## 2018-01-01 RX ADMIN — PANTOPRAZOLE SODIUM 40 MG: 40 INJECTION, POWDER, FOR SOLUTION INTRAVENOUS at 13:27

## 2018-01-01 RX ADMIN — PANTOPRAZOLE SODIUM 40 MG: 40 INJECTION, POWDER, FOR SOLUTION INTRAVENOUS at 11:57

## 2018-01-01 RX ADMIN — LEVOTHYROXINE SODIUM 50 MCG: 50 TABLET ORAL at 06:12

## 2018-01-01 RX ADMIN — ACETAMINOPHEN 650 MG: 325 TABLET, FILM COATED ORAL at 09:38

## 2018-01-01 RX ADMIN — METOPROLOL TARTRATE 5 MG: 1 INJECTION, SOLUTION INTRAVENOUS at 17:06

## 2018-01-01 RX ADMIN — METOPROLOL TARTRATE 25 MG: 25 TABLET ORAL at 09:38

## 2018-01-01 RX ADMIN — CEFTRIAXONE SODIUM 1 G: 1 INJECTION, POWDER, FOR SOLUTION INTRAMUSCULAR; INTRAVENOUS at 11:30

## 2018-01-01 RX ADMIN — LEVETIRACETAM 500 MG: 500 TABLET, FILM COATED ORAL at 09:40

## 2018-01-01 RX ADMIN — ACETAMINOPHEN 650 MG: 650 SUPPOSITORY RECTAL at 23:53

## 2018-01-01 RX ADMIN — POTASSIUM CHLORIDE: 2 INJECTION, SOLUTION, CONCENTRATE INTRAVENOUS at 00:34

## 2018-01-01 RX ADMIN — PETROLATUM: 42 OINTMENT TOPICAL at 15:31

## 2018-01-01 RX ADMIN — PANTOPRAZOLE SODIUM 40 MG: 40 INJECTION, POWDER, FOR SOLUTION INTRAVENOUS at 08:12

## 2018-01-01 RX ADMIN — DEXTROSE, SODIUM CHLORIDE, AND POTASSIUM CHLORIDE: 5; .45; .15 INJECTION INTRAVENOUS at 12:15

## 2018-01-01 RX ADMIN — Medication 10 ML: at 20:02

## 2018-01-01 RX ADMIN — PETROLATUM: 42 OINTMENT TOPICAL at 17:05

## 2018-01-01 RX ADMIN — METOPROLOL TARTRATE 5 MG: 1 INJECTION, SOLUTION INTRAVENOUS at 18:15

## 2018-01-01 RX ADMIN — POTASSIUM CHLORIDE 10 MEQ: 10 TABLET, EXTENDED RELEASE ORAL at 21:58

## 2018-01-01 RX ADMIN — MORPHINE SULFATE 5 MG: 10 SOLUTION ORAL at 23:29

## 2018-01-01 RX ADMIN — MORPHINE SULFATE 1 MG: 2 INJECTION, SOLUTION INTRAMUSCULAR; INTRAVENOUS at 01:30

## 2018-01-01 RX ADMIN — SODIUM CHLORIDE: 9 INJECTION, SOLUTION INTRAVENOUS at 00:47

## 2018-01-01 RX ADMIN — POTASSIUM CHLORIDE 10 MEQ: 10 TABLET, EXTENDED RELEASE ORAL at 08:44

## 2018-01-01 RX ADMIN — DEXTROSE MONOHYDRATE: 50 INJECTION, SOLUTION INTRAVENOUS at 00:20

## 2018-01-01 RX ADMIN — SODIUM CHLORIDE: 4.5 INJECTION, SOLUTION INTRAVENOUS at 07:49

## 2018-01-01 RX ADMIN — ALLOPURINOL 100 MG: 100 TABLET ORAL at 09:47

## 2018-01-01 RX ADMIN — METOPROLOL TARTRATE 5 MG: 1 INJECTION, SOLUTION INTRAVENOUS at 04:45

## 2018-01-01 RX ADMIN — SODIUM CHLORIDE 250 ML: 9 INJECTION, SOLUTION INTRAVENOUS at 18:40

## 2018-01-01 RX ADMIN — FAMOTIDINE 20 MG: 20 TABLET ORAL at 22:07

## 2018-01-01 RX ADMIN — Medication 10 ML: at 09:39

## 2018-01-01 RX ADMIN — ALLOPURINOL 100 MG: 100 TABLET ORAL at 08:44

## 2018-01-01 RX ADMIN — METOPROLOL TARTRATE 5 MG: 1 INJECTION, SOLUTION INTRAVENOUS at 00:02

## 2018-01-01 RX ADMIN — METOPROLOL TARTRATE 2.5 MG: 1 INJECTION, SOLUTION INTRAVENOUS at 02:43

## 2018-01-01 RX ADMIN — PETROLATUM: 42 OINTMENT TOPICAL at 14:23

## 2018-01-01 RX ADMIN — LORAZEPAM 0.5 MG: 2 INJECTION INTRAMUSCULAR; INTRAVENOUS at 21:58

## 2018-01-01 RX ADMIN — POTASSIUM CHLORIDE: 2 INJECTION, SOLUTION, CONCENTRATE INTRAVENOUS at 11:09

## 2018-01-01 RX ADMIN — FUROSEMIDE 20 MG: 20 TABLET ORAL at 08:44

## 2018-01-01 RX ADMIN — METOPROLOL TARTRATE 2.5 MG: 1 INJECTION, SOLUTION INTRAVENOUS at 19:15

## 2018-01-01 RX ADMIN — PANTOPRAZOLE SODIUM 40 MG: 40 INJECTION, POWDER, FOR SOLUTION INTRAVENOUS at 11:30

## 2018-01-01 RX ADMIN — Medication 10 ML: at 14:48

## 2018-01-01 RX ADMIN — ACETAMINOPHEN 650 MG: 650 SUPPOSITORY RECTAL at 15:03

## 2018-01-01 RX ADMIN — METOPROLOL TARTRATE 5 MG: 1 INJECTION, SOLUTION INTRAVENOUS at 23:01

## 2018-01-01 RX ADMIN — METOPROLOL TARTRATE 5 MG: 1 INJECTION, SOLUTION INTRAVENOUS at 12:13

## 2018-01-01 RX ADMIN — SODIUM CHLORIDE 250 ML: 9 INJECTION, SOLUTION INTRAVENOUS at 15:27

## 2018-01-01 RX ADMIN — DEXTROSE MONOHYDRATE: 50 INJECTION, SOLUTION INTRAVENOUS at 08:12

## 2018-01-01 RX ADMIN — FUROSEMIDE 20 MG: 20 TABLET ORAL at 09:39

## 2018-01-01 RX ADMIN — PETROLATUM: 42 OINTMENT TOPICAL at 09:38

## 2018-01-01 RX ADMIN — WATER 2 G: 1 INJECTION INTRAMUSCULAR; INTRAVENOUS; SUBCUTANEOUS at 11:57

## 2018-01-01 RX ADMIN — PETROLATUM: 42 OINTMENT TOPICAL at 21:52

## 2018-01-01 RX ADMIN — ACETAMINOPHEN 650 MG: 650 SUPPOSITORY RECTAL at 15:27

## 2018-01-01 RX ADMIN — Medication 10 ML: at 11:31

## 2018-01-01 RX ADMIN — ACETAMINOPHEN 650 MG: 650 SUPPOSITORY RECTAL at 00:03

## 2018-01-01 RX ADMIN — WATER 2 G: 1 INJECTION INTRAMUSCULAR; INTRAVENOUS; SUBCUTANEOUS at 12:41

## 2018-01-01 RX ADMIN — ALLOPURINOL 100 MG: 100 TABLET ORAL at 09:39

## 2018-01-01 RX ADMIN — Medication 10 ML: at 01:30

## 2018-01-01 RX ADMIN — METOPROLOL TARTRATE 25 MG: 25 TABLET ORAL at 21:09

## 2018-01-01 ASSESSMENT — PAIN SCALES - GENERAL
PAINLEVEL_OUTOF10: 4
PAINLEVEL_OUTOF10: 0
PAINLEVEL_OUTOF10: 3
PAINLEVEL_OUTOF10: 8
PAINLEVEL_OUTOF10: 0
PAINLEVEL_OUTOF10: 6
PAINLEVEL_OUTOF10: 3
PAINLEVEL_OUTOF10: 0
PAINLEVEL_OUTOF10: 4
PAINLEVEL_OUTOF10: 0
PAINLEVEL_OUTOF10: 4
PAINLEVEL_OUTOF10: 0
PAINLEVEL_OUTOF10: 5
PAINLEVEL_OUTOF10: 0
PAINLEVEL_OUTOF10: 6
PAINLEVEL_OUTOF10: 0

## 2018-01-01 ASSESSMENT — PAIN SCALES - WONG BAKER
WONGBAKER_NUMERICALRESPONSE: 4
WONGBAKER_NUMERICALRESPONSE: 6
WONGBAKER_NUMERICALRESPONSE: 2
WONGBAKER_NUMERICALRESPONSE: 4
WONGBAKER_NUMERICALRESPONSE: 4
WONGBAKER_NUMERICALRESPONSE: 0
WONGBAKER_NUMERICALRESPONSE: 0
WONGBAKER_NUMERICALRESPONSE: 6

## 2018-01-01 ASSESSMENT — PAIN DESCRIPTION - LOCATION
LOCATION: OTHER (COMMENT)

## 2018-01-01 ASSESSMENT — ENCOUNTER SYMPTOMS
COUGH: 0
BLURRED VISION: 0
PHOTOPHOBIA: 0
NAUSEA: 0
VOMITING: 0
SHORTNESS OF BREATH: 0
STRIDOR: 0

## 2018-01-01 ASSESSMENT — PAIN DESCRIPTION - PAIN TYPE
TYPE: ACUTE PAIN
TYPE: ACUTE PAIN

## 2018-09-03 PROBLEM — I62.03 ACUTE ON CHRONIC INTRACRANIAL SUBDURAL HEMATOMA (HCC): Status: ACTIVE | Noted: 2018-01-01

## 2018-09-03 PROBLEM — S06.5XAA SUBDURAL HEMATOMA: Status: ACTIVE | Noted: 2018-01-01

## 2018-09-03 PROBLEM — I62.01 ACUTE ON CHRONIC INTRACRANIAL SUBDURAL HEMATOMA (HCC): Status: ACTIVE | Noted: 2018-01-01

## 2018-09-03 PROBLEM — S06.5XAA ACUTE SUBDURAL HEMATOMA: Status: ACTIVE | Noted: 2018-01-01
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
## 2018-09-03 NOTE — ED NOTES
Bed: 23  Expected date:   Expected time:   Means of arrival:   Comments:     Maria Guadalupe Caraballo RN  09/03/18 0559

## 2018-09-03 NOTE — CONSULTS
 Sexual activity: Not on file     Other Topics Concern    Not on file     Social History Narrative    No narrative on file       Medications:   Current Facility-Administered Medications   Medication Dose Route Frequency Provider Last Rate Last Dose    sodium chloride flush 0.9 % injection 10 mL  10 mL Intravenous 2 times per day Jessica Barth MD        sodium chloride flush 0.9 % injection 10 mL  10 mL Intravenous PRN Jessica Barth MD        potassium chloride (KLOR-CON M) extended release tablet 40 mEq  40 mEq Oral PRN Jessica Barth MD        Or    potassium chloride 20 MEQ/15ML (10%) oral solution 40 mEq  40 mEq Oral PRN Jessica Barth MD        Or    potassium chloride 10 mEq/100 mL IVPB (Peripheral Line)  10 mEq Intravenous PRN Jessica Barth MD        magnesium hydroxide (MILK OF MAGNESIA) 400 MG/5ML suspension 30 mL  30 mL Oral Daily PRN Jessica Barth MD        ondansetron [facility]) injection 4 mg  4 mg Intravenous Q6H PRN Jessica Barth MD        allopurinol (ZYLOPRIM) tablet 100 mg  100 mg Oral Daily Jessica Barth MD        famotidine (PEPCID) tablet 20 mg  20 mg Oral Daily Jessica Barth MD        levothyroxine (SYNTHROID) tablet 50 mcg  50 mcg Oral Daily Jessica Barth MD        metoprolol tartrate (LOPRESSOR) tablet 12.5 mg  12.5 mg Oral BID Jessica Barth MD        potassium chloride (KLOR-CON M) extended release tablet 10 mEq  10 mEq Oral Daily Jessica Barth MD        simvastatin (ZOCOR) tablet 20 mg  20 mg Oral Nightly Jessica Barth MD        0.9 % sodium chloride infusion   Intravenous Continuous Vista Delay, DO            Allergies:    Patient has no known allergies. Review of Systems   Constitutional: Negative for chills and fever. HENT: Positive for hearing loss. Negative for congestion and tinnitus. Eyes: Negative for blurred vision and photophobia. Left eye esotropia   Respiratory: Negative for cough, shortness of breath and stridor.     Cardiovascular: Negative for chest pain and signed by Shameka Mojica MD on 9/3/2018 at 7:19 PM   Pager: [phone]

## 2018-09-04 PROBLEM — S06.5XAA ACUTE SUBDURAL HEMATOMA: Status: RESOLVED | Noted: 2018-01-01 | Resolved: 2018-01-01

## 2018-09-04 PROBLEM — E03.9 ACQUIRED HYPOTHYROIDISM: Chronic | Status: ACTIVE | Noted: 2018-01-01

## 2018-09-04 PROBLEM — E78.5 HYPERLIPIDEMIA LDL GOAL <100: Chronic | Status: ACTIVE | Noted: 2018-01-01

## 2018-09-04 PROBLEM — S06.5XAA SUBDURAL HEMATOMA: Status: RESOLVED | Noted: 2018-01-01 | Resolved: 2018-01-01

## 2018-09-04 PROBLEM — I10 ESSENTIAL HYPERTENSION: Chronic | Status: ACTIVE | Noted: 2018-01-01

## 2018-09-04 NOTE — H&P
09/04/2018    GLUCOSE 151 09/04/2018    GLUCOSE 119 05/06/2011    PROT 7.4 09/04/2018    LABALBU 3.4 09/04/2018    LABALBU 4.0 05/03/2011    CALCIUM 9.5 09/04/2018    BILITOT 0.8 09/04/2018    ALKPHOS 87 09/04/2018    AST 27 09/04/2018    ALT 18 09/04/2018     BMP:    Lab Results   Component Value Date     09/04/2018    K 3.9 09/04/2018    CL 99 09/04/2018    CO2 21 09/04/2018    BUN 22 09/04/2018    LABALBU 3.4 09/04/2018    LABALBU 4.0 05/03/2011    CREATININE 0.8 09/04/2018    CALCIUM 9.5 09/04/2018    GFRAA >60 09/04/2018    LABGLOM >60 09/04/2018    GLUCOSE 151 09/04/2018    GLUCOSE 119 05/06/2011     Magnesium:    Lab Results   Component Value Date    MG 2.2 05/05/2011     Phosphorus:    Lab Results   Component Value Date    PHOS 3.7 05/05/2011     PT/INR:    Lab Results   Component Value Date    PROTIME 13.8 09/04/2018    PROTIME 14.1 04/24/2011    INR 1.2 09/04/2018     PTT:    Lab Results   Component Value Date    APTT 24.3 09/04/2018   [APTT}  Troponin:    Lab Results   Component Value Date    TROPONINI <0.01 09/03/2018     Last 3 Troponin:    Lab Results   Component Value Date    TROPONINI <0.01 09/03/2018     U/A:  No results found for: NITRITE, COLORU, PROTEINU, PHUR, LABCAST, [address], RBCUA, MUCUS, TRICHOMONAS, YEAST, BACTERIA, CLARITYU, SPECGRAV, LEUKOCYTESUR, UROBILINOGEN, BILIRUBINUR, BLOODU, GLUCOSEU, AMORPHOUS  HgBA1c:  No results found for: LABA1C  FLP:    Lab Results   Component Value Date    TRIG 139 04/25/2011    HDL 36.0 04/25/2011    LDLCALC 69 04/25/2011     TSH:    Lab Results   Component Value Date    TSH 0.958 04/25/2011       ASSESSMENT:         Acute on chronic intracranial subdural hematoma ([address] (9/3/2018)     Essential hypertension (9/4/2018)     Acquired hypothyroidism (9/4/2018)     Hyperlipidemia LDL goal <100 (9/4/2018)          PLAN:    Trauma and neurosurgery managing. Blood pressure ok, continue current therapy. Continue synthroid.   Continue aggressive lipid therapy. Pt/Ot evaluations for discharge planning. Ok to discharge if no intervention planned from above specialist.  Will ask psychiatry to evaluate bizzare behavior.       Saurabh Rojas MD  9:54 AM  9/4/2018

## 2018-09-04 NOTE — PROGRESS NOTES
examination when patient condition permits better study. 2. No conspicuous acute displaced fractures however when identified. No fluid levels are seen in the paranasal sinus. No subcutaneous   emphysema. CT Head WO Contrast   Final Result   1. Persistent mixed density right subdural hematoma and discrete   midline shift to the left since the previous examination performed   September 3, 2018, at 12:00.      2. There is a questionable minimal left subdural formation over the   left cerebral convexity parietal topography. 3. Continued follow-up study recommended. CT Chest WO Contrast   Final Result      1. No acute intrathoracic findings. 2. Destructive soft tissue mass arising from the left third rib   concerning for a neoplasm. Correlation with tissue sampling is   advised. 3. Borderline cardiomegaly. ALERT:  THIS IS AN ABNORMAL REPORT            CT ABDOMEN PELVIS WO CONTRAST   Final Result      1. No traumatic intra-abdominal findings. 2. Borderline splenomegaly. 3. Hiatal hernia. PHYSICAL EXAM:   GCS:  4 - Opens eyes on own   6 - Follows simple motor commands  5 - Alert and oriented    Pupil size:  Left 3 mm Right 3 mm  Pupil reaction: Yes  Wiggles fingers: Left Yes Right Yes  Hand grasp:   Left normal Right normal  Wiggles toes: Left Yes    Right Yes  Plantar flexion: Left normal  Right normal    BP (!) 149/62   Pulse 79   Temp 97.7 °F (36.5 °C) (Temporal)   Resp 15   Ht 5' 3\" (1.6 m)   Wt 165 lb (74.8 kg)   SpO2 99%   BMI 29.23 kg/m²   General appearance: alert, appears stated age and cooperative  Head: Normocephalic, without obvious abnormality, Left facial ecchymosis  Eyes: conjunctivae/corneas clear. PERRL, EOM's intact. Fundi benign. , Congenital L esotropia  Lungs: Non-labored breathing  Heart: regular rate and rhythm  Abdomen: soft, non-tender; bowel sounds normal; no masses,  no organomegaly  Extremities: extremities normal, atraumatic, no Turn & reposition. PT/OT when able   AM PAC per PT/OT. Monitor for skin breakdown. · Heme: No acute issues. No anemia  Monitor CBC. Bowel regime: MOM. Pain control/Sedation:   Tylenol PRN    DVT prophylaxis: Defer to medicine  GI: Pepcid. Normal diet  Glucose protocol:  ISS  Mouth/Eye care: As needed. Code status:   Full Code    Patient/Family update:  As available    Disposition:  Per IM. No further surgical intervention anticipated at this point. Please recall trauma as needed. Electronically signed by Manuel Schmid DO on 9/4/18 at 8:23 AM        [address] NOTE     I have examined the patient, reviewed the record, and discussed the case with the APN/ Resident. I have reviewed all relevant labs and imaging data. Please refer to the APN/ resident's note. I agree with the assessment and plan with the following corrections/ additions. The following summarizes my clinical findings and independent assessment. CC: fall    Patient without complaints. Awake and alert. Follows commands. Heart: Regular. Lungs: Fairly clear bilaterally. Abdomen: Soft; bowel sounds active; nontender/nondistended. Skin: Warm/dry    Patient Active Problem List    Diagnosis Date Noted    Essential hypertension 09/04/2018    Acquired hypothyroidism 09/04/2018    Hyperlipidemia LDL goal <100 09/04/2018    Acute on chronic intracranial subdural hematoma ([facility] [address] 09/03/2018       Status post fall  Acute on chronic subdural hematoma--monitor neuro exam  Diet as tolerated  PT/OT evals  Dispo per primary service    Marcie Bolanos MD, FACS  9/4/2018  7:14 PM      NOTE: This report was transcribed using voice recognition software. Every effort was made to ensure accuracy; however, inadvertent computerized transcription errors may be present.

## 2018-09-04 NOTE — PROGRESS NOTES
FMC/dexterity noted during ADL tasks     LUE 4/5 wfl wfl  and wfl FMC/dexterity noted during ADL tasks   Sensation: wfl B UEs  Tone: wfl B UEs  Edema: none noted B UEs    Functional Assessment:   Initial Eval Status  Comments   Feeding  Ind. Grooming  SBA    Upper Body Dressing SBA     Lower Body Dressing Min A    Bathing NT    Toileting  NT    Bed Mobility  Supine to Sit: NT  Sit to Supine: NT Pt. received sitting on EOB finishing breakfast tray. Functional Transfers Sit <> stand: SBA   SPT: SBA    Functional Mobility SBA with w.walker greater than household distance with Fair tolerance      Sit balance: Sup  Stand balance: SBA  Endurance/Activity tolerance: Fair with lt. / mod. Ax. Comments: Upon arrival pt seated EOB. At end of session pt seated in bedside chair, all needs met, RN notified,  with all devices within reach, all lines and tubes intact. Pt. Instructed RE: safe transfers/mobility, ADLs, role of OT, treatment plan, recs. , prec. Pt. Demonstrates Fair+ understanding & carry over. Pt. requires continued in pt. OT services to address deficits in ADLs, bed mobility, functional transfers/mobility, balance, endurance, safety. Skilled Treatment: consisted of simple ADLs, bed mobility, functional transfers/mobility with focus on safety, technique, precautions. Assessment of current deficits   Functional mobility [x]  ADLs [x] Strength []  Cognition []  Functional transfers  [x] IADLs [x] Safety Awareness [x]  Endurance [x]  Fine Motor Coordination [] Balance [x] Vision/perception [] Sensation []   Gross Motor Coordination [] ROM []       Eval Complexity: low    Treatment frequency: 15-30 min. 4-5X/wk.      Plan of Care:  ADL retraining [x]   Equipment needs [x]   Neuromuscular re-education []  Energy Conservation Techniques [x]  Functional Transfer training [x]  Patient and/or Family Education [x]  Functional Mobility training [x]  Environmental Modifications

## 2018-09-04 NOTE — PROGRESS NOTES
Physical Therapy    Facility/Department: [address] NEURO SPINE  Initial Assessment    NAME: Waqas Lou  : 1930  MRN: [id number]    Date of Service: 2018         Evaluating Therapist: Addy Smith PT, DPT    Equipment Recommendations: None    Room #: 2479Y  DIAGNOSIS: Subdural Hematoma  PRECAUTIONS: Falls, [place]    Social:  Pt lives in a senior citizen apartment. Pt lives on the 2nd floor plan 6 steps and 2 rails to enter. Prior to admission pt walked with ww. Initial Evaluation  Date: 18 Treatment  Date:     Short Term/ Long Term   Goals   Was pt agreeable to Eval/treatment? Yes     Does pt have pain? No c/o pain     Bed Mobility  Rolling: NT  Supine to sit: NT  Sit to supine: NT  Scooting: NT  Independent   Transfers Sit to stand: SBA  Stand to sit: SBA  Stand pivot: SBA  Independent   Ambulation   100 feet with SBA with ww  >200 feet with modified Independent with ww   Stair negotiation: ascended and descended 4 steps with 1 rail with CGA ascending and Santos descending  >10 steps with 1 rail with modified Independent   AM-PAC        Pt is alert and Oriented x 3  BLE ROM is WFL. BLE strength is grossly 5/5. Balance: Independent sitting EOB. SBA static and dynamic standing with ww. Sensation: No numbness or tingling. Edema: No notable edema. Endurance: Fair +  Skin was inspected: Ecchymosis around L eye. Chair alarm: No     ASSESSMENT  Pt displays functional ability as noted in the objective portion of this evaluation. Comments/Treatment:  Pt found sitting EOB and was agreeable to evaluation. Pt demonstrated good sitting balance sitting EOB. Pt steady with functional mobility and did not experience any LOB. Pt required VCs for ww approximation but was unable to maintain ww approximation. Pt ascended stairs reciprocally and descended stairs non-reciprocally. Pt left sitting up in a chair with call light in reach. Nursing staff notified of pt left sitting in a chair.     Patient education  Pt educated on safety with functional mobility and ww approximation. Patient response to education:   Pt verbalized understanding Pt demonstrated skill Pt requires further education in this area   x x x     Rehab potential is Good for reaching above PT goals. Pts/ family goals   1. To return home. Patient and or family understand(s) diagnosis, prognosis, and plan of care. PLAN  PT care will be provided in accordance with the objectives noted above. Whenever appropriate, clear delegation orders will be provided for nursing staff. Exercises and functional mobility practice will be used as well as appropriate assistive devices or modalities to obtain goals. Patient and family education will also be administered as needed. Frequency of treatments will be 2-5x/week x 1-3 days.     Time in: 0805  Time out: Wero ESCOBAR, DPT  License number:  [id number]

## 2018-09-04 NOTE — PROGRESS NOTES
Department of Neurosurgery  Progress Note    CHIEF COMPLAINT: Right acute on chronic SDH    SUBJECTIVE:  Pt aroused by voice. States she does not have a headache, weakness, or numbness. No new issues overnight. REVIEW OF SYSTEMS :  Constitutional: Negative for chills and fever. Neurological: Negative for dizziness, tremors and speech change. OBJECTIVE:   VITALS:  BP (!) 149/62   Pulse 79   Temp 97.7 °F (36.5 °C) (Temporal)   Resp 15   Ht 5' 3\" (1.6 m)   Wt 165 lb (74.8 kg)   SpO2 99%   BMI 29.23 kg/m²     PHYSICAL:  Constitutional: She appears well-nourished. Head: Normocephalic. Facial trauma, left eye bruising   Eyes: Pupils are equal, round, and reactive to light. EOM are normal.   Neck: No tracheal deviation present. Cardiovascular: Normal rate and regular rhythm. Pulmonary/Chest: Breath sounds normal. No stridor. Abdominal: She exhibits no distension. Neurological:     Alert and oriented x3   Left eye esotropia   Face symmetric   Motor strength symmetric   No focal weakness. Sensation intact to LT  Skin: Skin is warm and dry.    Psychiatric: Thought content normal.     DATA:  CBC:   Lab Results   Component Value Date    WBC 10.2 09/04/2018    RBC 2.86 09/04/2018    HGB 8.2 09/04/2018    HCT 26.0 09/04/2018    MCV 90.9 09/04/2018    MCH 28.7 09/04/2018    MCHC 31.5 09/04/2018    RDW 16.2 09/04/2018    PLT 93 09/04/2018    MPV 11.4 09/04/2018     BMP:    Lab Results   Component Value Date     09/04/2018    K 3.9 09/04/2018    CL 99 09/04/2018    CO2 21 09/04/2018    BUN 22 09/04/2018    LABALBU 3.4 09/04/2018    LABALBU 4.0 05/03/2011    CREATININE 0.8 09/04/2018    CALCIUM 9.5 09/04/2018    GFRAA >60 09/04/2018    LABGLOM >60 09/04/2018    GLUCOSE 151 09/04/2018    GLUCOSE 119 05/06/2011     PT/INR:    Lab Results   Component Value Date    PROTIME 13.8 09/04/2018    PROTIME 14.1 04/24/2011    INR 1.2 09/04/2018     PTT:    Lab Results   Component Value Date    APTT 24.3 09/04/2018   [APTT}    Current Inpatient Medications  Current Facility-Administered Medications: levETIRAcetam (KEPPRA) tablet 500 mg, 500 mg, Oral, BID  acetaminophen (TYLENOL) tablet 500 mg, 500 mg, Oral, Q6H PRN  sodium chloride flush 0.9 % injection 10 mL, 10 mL, Intravenous, 2 times per day  sodium chloride flush 0.9 % injection 10 mL, 10 mL, Intravenous, PRN  potassium chloride (KLOR-CON M) extended release tablet 40 mEq, 40 mEq, Oral, PRN **OR** potassium chloride 20 MEQ/15ML (10%) oral solution 40 mEq, 40 mEq, Oral, PRN **OR** potassium chloride 10 mEq/100 mL IVPB (Peripheral Line), 10 mEq, Intravenous, PRN  magnesium hydroxide (MILK OF MAGNESIA) 400 MG/5ML suspension 30 mL, 30 mL, Oral, Daily PRN  ondansetron (ZOFRAN) injection 4 mg, 4 mg, Intravenous, Q6H PRN  allopurinol (ZYLOPRIM) tablet 100 mg, 100 mg, Oral, Daily  famotidine (PEPCID) tablet 20 mg, 20 mg, Oral, Daily  levothyroxine (SYNTHROID) tablet 50 mcg, 50 mcg, Oral, Daily  metoprolol tartrate (LOPRESSOR) tablet 12.5 mg, 12.5 mg, Oral, BID  potassium chloride (KLOR-CON M) extended release tablet 10 mEq, 10 mEq, Oral, Daily  simvastatin (ZOCOR) tablet 20 mg, 20 mg, Oral, Nightly  0.9 % sodium chloride infusion, , Intravenous, Continuous    ASSESSMENT:   Right acute on chronic subdural hematoma     Waqas Lou is a 80 y.o.  female who presents with a fall. The fall was 10 days ago. She states that she takes aspirin 81 mg daily.       9/3/18 head CT:  acute on chronic right subdural hematoma. It is 9.2 mm thick. There is no significant shift. The basal cisterns are patent. There is no local mass effect.     She is asymptomatic from the right acute on chronic subdural hematoma. No surgical intervention is currently needed.     PLAN:  -No surgical intervention needed at this time.  -Hold aspirin, no antiplatelet agents and no anticoagulation.  -Okay for chemical DVT prophylaxis  -Follow-up in 1 month in neurosurgery clinic with repeat head CT

## 2018-09-05 NOTE — CONSULTS
PSYCHIATRIC CONSULT      CHIEF COMPLAINT:   [] Mood Problems [x] Anxiety Problems [] Psychosis                    [] Suicidal/Homicidal   [] Aggression  [] Other    HISTORY OF PRESENT ILLNESS: Latrell Crawford is a 81 yo female who presents status post fall with ams. She is AAO X 3 at this time and is denying SI HI OR AVH. Precipitating Factors:     [x] Family Stress   [] Recent loss/grief Stress   [x] Health Stress   [] Relationship Stress    [] Legal Stress   [x] Environmental Stress    [] Occupational Stress   [] Financial Stress   [] Substance Abuse [] Other      PAST PSYCHIATRIC HISTORY:   History of psychiatric Hospitalization:    [x] Denies    [] yes  [] Days ago     []  Weeks Ago    [] Months ago  [] Years ago              [] [facility]  [] [facility]  [] Other:        [] Once  [] More than once    Outpatient treatment:  [] Gurvinder Bowles  [] Carol[facility]  [] [facility] [employer]              [] [facility]  [] Manuela Fleischer      [] [facility] [] [facility]      [] [facility] [] [facility]  [] [facility] [] [facility]             [] currently  [] in the past  [] Non-Compliant    [x] Denies    Previous suicide attempt: [x]Denies            [] yes  [] OD  [] Cutting  [] Hanging  [] Gun  [] Other    Previous psych medications:  [] Was prescribed               [] Currently Taking       [x] Never taken medications      PAST MEDICAL HISTORY:     family history is not on file. History reviewed. No pertinent family history.  family history        [] Endorses    [] Father  [] Mother  [] Sibling [] [address] Parent      [] Depression  [] Anxiety  [] Bipolar  [] Psychosis    [x]  Denies          Past Medical History:   Diagnosis Date    CAD (coronary artery disease)     ischemic heart disease    Cataract     ou    Gout     Hyperlipidemia     Hypertension     Kidney disorder     renal insufficiency    Mild cognitive impairment     [facility]ocyt[facility] [address]     Thyroid disease     hypothyroidism       Past Medical History:   Diagnosis Date    CAD (coronary artery disease)     ischemic heart disease    Cataract     ou    Gout     Hyperlipidemia     Hypertension     Kidney disorder     renal insufficiency    Mild cognitive impairment     Thrombocytopenia ([address]     Thyroid disease     hypothyroidism    medical history  Past Surgical History:   Procedure Laterality Date    CARDIAC SURGERY      intra coronary stents x 2    CHOLECYSTECTOMY  1985    WRIST SURGERY  10/14    closed dislocation fx  left wrist with external fixation device      Past Surgical History:   Procedure Laterality Date    CARDIAC SURGERY      intra coronary stents x 2    CHOLECYSTECTOMY  1985    WRIST SURGERY  10/14    closed dislocation fx  left wrist with external fixation device    surgical history  [] Denies       SOCIAL HISTORY:     1. Living Situation:[x] Private Residence [] Homeless [] [email or web address]             [] Assisted Living [] [address] [facility]  [] Shelter [] Other   2. Employment:  [] Yes  [x] No  3. Legal History: [x] No Arrest [] Arrest  [] Theft  []  Assault  [] Substances   4. History of Trama/ Abuse: [x] Denies  [] Emotional [] Physical [] Sexual   5. Spirituality: [x] Spiritual [] Not Spiritual   6. Substance Abuse: [x] Denies  [] Drug of choice      [] Amphetamines [] Marijuana [] Cocaine      [] Opioids  [] Alcohol  [] Benzodiazepines  For further SH review SW note. Risk Assessment:  1. Risk Factors:   [x] Depression  [x] Anxiety  [] Psychosis   [] Suicidal/Homicidal Thoughts [] Suicide Attempt [] Substance Abuse     2. Protective Factors: [x] Controlled Environment         [x] Supportive Family []         [] [employer] Support     3. Level of Risk: [] Mild [x] Moderate [] Severe      Strengths & Weaknesses:    1. Strengths: [x] Ability to communicate feelings     [x] Independent ADL's     [x] Supportive Family    [] Current Health Status     2.  Weaknesses: [x] Emotional          [] Motivational        Active Orders   General Supply    Supply Request with turn every 2 hours if patient is unable to turn self. Remind patient to turn if necessary.      Frequency: Until Discontinued     Number of Occurrences:  Until Specified    Up with assistance     Frequency: PRN     Number of Occurrences:  Until Specified    Vital signs per unit routine     Frequency: Until Discontinued     Number of Occurrences:  Until Specified   Code Status    Full Code     Frequency: Continuous     Number of Occurrences:  Until Specified   Consult    Inpatient consult to Psychiatry     Frequency: 1 Time     Number of Occurrences:  1 Occurrences    Inpatient consult to Trauma Surgery     Frequency: 1 Time     Number of Occurrences:  1 Occurrences   Respiratory Care    Initiate Oxygen Therapy Protocol     Frequency: Daily     Number of Occurrences:  Until Specified     Order Comments: - If patient has any of the following conditions, initiate oxygen therapy: SpO2 less than 92%, Cyanosis, Chest Pain, Dyspnea, Home oxygen, or Altered level of consciousness    - If oxygen therapy initiated, enter the RT51 Nasal cannula oxygen order using Per Protocol order mode using the defaulted order parameters and titrate as specified in that order    - If oxygen therapy initiated, notify provider         Medications    0.9 % sodium chloride infusion     Frequency: Continuous     Route: Intravenous    acetaminophen (TYLENOL) tablet 500 mg     Frequency: Q6H PRN     Dose: 500 mg     Route: Oral    allopurinol (ZYLOPRIM) tablet 100 mg     Frequency: Daily     Dose: 100 mg     Route: Oral    famotidine (PEPCID) tablet 20 mg     Frequency: Daily     Dose: 20 mg     Route: Oral    levETIRAcetam (KEPPRA) tablet 500 mg     Frequency: BID     Dose: 500 mg     Route: Oral    levothyroxine (SYNTHROID) tablet 50 mcg     Frequency: Daily     Dose: 50 mcg     Route: Oral    magnesium hydroxide (MILK OF MAGNESIA) 400 MG/5ML suspension 30 mL     Frequency: Daily PRN     Dose: 30 mL     Route: Oral    metoprolol tartrate

## 2018-09-17 PROBLEM — D61.818 PANCYTOPENIA (HCC): Status: ACTIVE | Noted: 2018-01-01

## 2018-09-17 NOTE — ED NOTES
Bed: 20  Expected date:   Expected time:   Means of arrival:   Comments:  marah Weaver, MARLEN  09/17/18 5454

## 2018-09-17 NOTE — ED PROVIDER NOTES
HPI:  9/17/18, Time: 7:16 PM         Epifanio Topete is a 80 y.o. female presenting to the ED for head injury, beginning today. The complaint has been constant, moderate in severity, and worsened by fall. Patient is hard of hearing and a poor historian. She has no complaints at this time. Review of Systems:   Pertinent positives and negatives are stated within HPI, all other systems reviewed and are negative.          --------------------------------------------- PAST HISTORY ---------------------------------------------  Past Medical History:  has a past medical history of CAD (coronary artery disease); Cataract; Gout; Hyperlipidemia; Hypertension; Kidney disorder; Mild cognitive impairment; Thrombocytopenia ([address] and Thyroid disease. Past Surgical History:  has a past surgical history that includes Wrist surgery (10/14); Cardiac surgery; and Cholecystectomy (1985). Social History:  reports that she does not drink alcohol or use drugs. Family History: family history is not on file. The patients home medications have been reviewed. Allergies: Patient has no known allergies.     -------------------------------------------------- RESULTS -------------------------------------------------  All laboratory and radiology results have been personally reviewed by myself   LABS:  Results for orders placed or performed during the hospital encounter of 09/17/18   Urine Culture   Result Value Ref Range    Urine Culture, Routine Growth not present, incubation continues    Troponin   Result Value Ref Range    Troponin <0.01 0.00 - 0.03 ng/mL   CBC Auto Differential   Result Value Ref Range    WBC 3.9 (L) 4.5 - 11.5 E9/L    RBC 2.34 (L) 3.50 - 5.50 E12/L    Hemoglobin 6.6 (LL) 11.5 - 15.5 g/dL    Hematocrit 20.2 (L) 34.0 - 48.0 %    MCV 86.3 80.0 - 99.9 fL    MCH 28.2 26.0 - 35.0 pg    MCHC 32.7 32.0 - 34.5 %    RDW 15.6 (H) 11.5 - 15.0 fL    Platelets 14 (LL) 063 - 450 E9/L    MPV NOT CALC 7.0 - 12.0 fL Neutrophils % 59.1 43.0 - 80.0 %    Lymphocytes % 30.4 20.0 - 42.0 %    Monocytes % 4.3 2.0 - 12.0 %    Eosinophils % 0.5 0.0 - 6.0 %    Basophils % 0.0 0.0 - 2.0 %    Neutrophils # 2.54 1.80 - 7.30 E9/L    Lymphocytes # 1.17 (L) 1.50 - 4.00 E9/L    Monocytes # 0.16 0.10 - 0.95 E9/L    Eosinophils # 0.00 (L) 0.05 - 0.50 E9/L    Basophils # 0.00 0.00 - 0.20 E9/L    Metamyelocytes Relative 2.6 (H) 0.0 - 1.0 %    Myelocytes Relative 3.5 0 - 0 %    Anisocytosis 1+     Polychromasia 3+     Poikilocytes 1+     Sarah Cells 1+     Ovalocytes 1+    Comprehensive Metabolic Panel   Result Value Ref Range    Sodium 138 132 - 146 mmol/L    Potassium 4.1 3.5 - 5.0 mmol/L    Chloride 104 98 - 107 mmol/L    CO2 22 22 - 29 mmol/L    Anion Gap 12 7 - 16 mmol/L    Glucose 117 (H) 74 - 109 mg/dL    BUN 23 8 - 23 mg/dL    CREATININE 1.0 0.5 - 1.0 mg/dL    GFR Non-African American 52 >=60 mL/min/1.73    GFR African American >60     Calcium 9.7 8.6 - 10.2 mg/dL    Total Protein 5.6 (L) 6.4 - 8.3 g/dL    Alb 2.2 (L) 3.5 - 5.2 g/dL    Total Bilirubin 0.7 0.0 - 1.2 mg/dL    Alkaline Phosphatase 95 35 - 104 U/L    ALT 24 0 - 32 U/L    AST 27 0 - 31 U/L   Protime-INR   Result Value Ref Range    Protime 17.4 (H) 9.3 - 12.4 sec    INR 1.5    APTT   Result Value Ref Range    aPTT 25.2 24.5 - 35.1 sec   Brain Natriuretic Peptide   Result Value Ref Range    Pro-BNP 1,522 (H) 0 - 450 pg/mL   Lactic Acid, Plasma   Result Value Ref Range    Lactic Acid 1.2 0.5 - 2.2 mmol/L   Urinalysis   Result Value Ref Range    Color, UA Yellow Straw/Yellow    Clarity, UA Clear Clear    Glucose, Ur Negative Negative mg/dL    Bilirubin Urine Negative Negative    Ketones, Urine Negative Negative mg/dL    Specific Gravity, UA 1.015 1.005 - 1.030    Blood, Urine Negative Negative    pH, UA 5.5 5.0 - 9.0    Protein, UA Negative Negative mg/dL    Urobilinogen, Urine 0.2 <2.0 E.U./dL    Nitrite, Urine Negative Negative    Leukocyte Esterase, Urine Negative Negative .   1. Left lateral mid upper lung mass noted on on today's study as   measured above, seen on the previous CT scan chest 9/3/2018 with   destruction of the left third rib highly suspicious for a primary lung   malignancy versus metastatic disease versus a primary bone malignancy. 2. Prominent of the hilar regions bilaterally could be reflective of   lymphadenopathy or metastatic disease versus prominent pulmonary   vasculature. Consider correlation with CT scan chest if clinically   warranted. 3. Bibasilar infiltrate/pneumonia. 4. Vascular calcifications thoracic aorta. CT Head WO Contrast   Final Result   ALERT: THIS IS AN ABNORMAL REPORT. Findings communicated   directly with Dr. Jeny Sewell at approximately 9/17/2018 8:28 PM .   1. Mixed density panhemispheric right subdural hematoma with both   hyperdense and isodense blood products has enlarged since previous   exam but does not demonstrate new areas of increased hyperdense blood   products on this study. There is mass effect on the right cerebral   hemisphere. Neurosurgical consultation and evaluation is recommended. 2. Moderate cerebral volume loss/atrophy. 3. Moderate white matter changes consistent with sequelae small vessel   ischemic disease. 4. Moderate hyperostosis frontalis interna. 5. Vascular calcifications within the bilateral internal carotid   artery cavernous segments. .   6. Mucous retention cyst left posterior ethmoid air cells. CT HEAD WO CONTRAST    (Results Pending)       ------------------------- NURSING NOTES AND VITALS REVIEWED ---------------------------   The nursing notes within the ED encounter and vital signs as below have been reviewed.    BP (!) 111/51   Pulse 75   Temp 97.1 °F (36.2 °C) (Temporal)   Resp 20   Ht 5' 3\" (1.6 m)   Wt 133 lb 1.6 oz (60.4 kg)   SpO2 95%   BMI 23.58 kg/m²   Oxygen Saturation Interpretation: Normal      ---------------------------------------------------PHYSICAL pancytopenia. Blood and platelets transfused. She still is hemodynamically stable with hemoccult negative stool. Likely pancytopenia related to lung mass on xray. This also shows infiltrate vs fluid and with elevated bnp and no cough in this patient I am not suspicious for pneumonia at this time. Discussed patient care with admitting physician and placed consult to hem/onc. Counseling: The emergency provider has spoken with the patient and discussed todays results, in addition to providing specific details for the plan of care and counseling regarding the diagnosis and prognosis. Questions are answered at this time and they are agreeable with the plan.      --------------------------------- IMPRESSION AND DISPOSITION ---------------------------------    IMPRESSION  1. Pancytopenia ([address]    2. Lung mass    3. SDH (subdural hematoma) ([facility])        DISPOSITION  Disposition: Admit to telemetry  Patient condition is stable      NOTE: This report was transcribed using voice recognition software.  Every effort was made to ensure accuracy; however, inadvertent computerized transcription errors may be present       Yue Best MD  09/18/18 [id number]

## 2018-09-18 PROBLEM — G30.9 ALZHEIMER'S DEMENTIA WITHOUT BEHAVIORAL DISTURBANCE (HCC): Chronic | Status: ACTIVE | Noted: 2018-01-01

## 2018-09-18 PROBLEM — I10 HTN (HYPERTENSION), BENIGN: Chronic | Status: ACTIVE | Noted: 2018-01-01

## 2018-09-18 PROBLEM — E78.2 MIXED HYPERLIPIDEMIA: Chronic | Status: ACTIVE | Noted: 2018-01-01

## 2018-09-18 PROBLEM — S06.5XAA SDH (SUBDURAL HEMATOMA): Status: ACTIVE | Noted: 2018-01-01

## 2018-09-18 PROBLEM — F02.80 ALZHEIMER'S DEMENTIA WITHOUT BEHAVIORAL DISTURBANCE (HCC): Chronic | Status: ACTIVE | Noted: 2018-01-01

## 2018-09-18 PROBLEM — D49.1 LUNG NEOPLASM: Chronic | Status: ACTIVE | Noted: 2018-01-01

## 2018-09-18 NOTE — CONSULTS
25 mg  25 mg Oral BID Hannah Mamadou, DO        furosemide (LASIX) tablet 20 mg  20 mg Oral Daily Hannah Mamadou, DO        allopurinol (ZYLOPRIM) tablet 100 mg  100 mg Oral Daily Hannah Mamadou, DO        folic acid (FOLVITE) tablet 1 mg  1 mg Oral Daily Hannah Mamadou, DO        potassium chloride (KLOR-CON M) extended release tablet 10 mEq  10 mEq Oral Daily Hannah Mamadou, DO        levothyroxine (SYNTHROID) tablet 50 mcg  50 mcg Oral Daily Hannah Mamadou, DO        famotidine (PEPCID) tablet 20 mg  20 mg Oral Daily Hannah Mamadou, DO        sodium chloride flush 0.9 % injection 10 mL  10 mL Intravenous 2 times per day Hannah Mamadou, DO        sodium chloride flush 0.9 % injection 10 mL  10 mL Intravenous PRN Hannah Mamadou, DO        magnesium hydroxide (MILK OF MAGNESIA) 400 MG/5ML suspension 30 mL  30 mL Oral Daily PRN Hannah Mamadou, DO        ondansetron [facility]) injection 4 mg  4 mg Intravenous Q6H PRN Hannah Mamadou, DO        0.9 % sodium chloride bolus  250 mL Intravenous Once Jovanni Gallego MD        0.9 % sodium chloride bolus  250 mL Intravenous Once Jovanni Gallego MD            Allergies:    Patient has no known allergies. Review of Systems   Unable to perform ROS: Dementia        Physical Exam   Constitutional: She appears well-developed. Pt is confused. HENT:   Head: Normocephalic and atraumatic. Eyes:   R > L pupil  Left eye not reactive (baseline)  Left eye esotropia   Neck: Normal range of motion. Cardiovascular: Normal rate. Pulmonary/Chest: Effort normal.   Abdominal: Soft. She exhibits no distension. Neurological:   Pt is confused. Not oriented. Follows simple commands. Motor strength symmetric. Sensation intact to LT   Skin: Skin is warm and dry.         /62   Pulse 114   Temp 99.1 °F (37.3 °C) (Oral)   Resp 20   Ht 5' 3\" (1.6 m)   Wt 133 lb 1.6 oz (60.4 kg)   SpO2 99%   BMI 23.58 kg/m²        Assessment:   R SDH after fall  Pancytopenia     Twyla platelets and blood transfusions.    Electronically signed by Urbano Dickson MD on 9/18/2018 at 10:44 AM

## 2018-09-18 NOTE — H&P
mouth daily  furosemide (LASIX) 20 MG tablet, Take 20 mg by mouth daily  metoprolol tartrate (LOPRESSOR) 25 MG tablet, Take 25 mg by mouth 2 times daily   levothyroxine (SYNTHROID) 50 MCG tablet, Take 50 mcg by mouth Daily. allopurinol (ZYLOPRIM) 100 MG tablet, Take 100 mg by mouth daily. famotidine (PEPCID) 20 MG tablet, Take 20 mg by mouth 2 times daily as needed   simvastatin (ZOCOR) 20 MG tablet, Take 20 mg by mouth nightly. Note that the patient's home medications were reviewed and the above list is accurate to the best of my knowledge at the time of the exam.    Allergies:    Patient has no known allergies. Social History:    reports that she does not drink alcohol or use drugs. Family History:   History is noncontributory due to advanced age and co-morbidities. REVIEW OF SYSTEMS:  As above in the HPI, otherwise negative    PHYSICAL EXAM:    Vitals:  BP (!) 111/51   Pulse 75   Temp 97.1 °F (36.2 °C) (Temporal)   Resp 20   Ht 5' 3\" (1.6 m)   Wt 133 lb 1.6 oz (60.4 kg)   SpO2 95%   BMI 23.58 kg/m²     General:  Awake, alert, oriented to person only. Well developed, well nourished, well groomed. No apparent distress. HEENT:  Normocephalic, atraumatic. Pupils equal, round, reactive to light. No scleral icterus. No conjunctival injection. Strabismus of left eye noted. Normal lips, teeth, and gums. No nasal discharge. Neck:  Supple  Heart:  RRR, no murmurs, gallops, or rubs  Lungs:  CTA bilaterally, bilat symmetrical expansion, no wheeze, rales, or rhonchi  Abdomen:   Bowel sounds present, soft, nontender, no masses, no organomegaly, no peritoneal signs  Extremities:  No clubbing, cyanosis, or edema  Skin:  Warm and dry, no open lesions or rash  Neuro:  Cranial nerves 2-12 intact, no focal deficits, moving all extremities equally  Breast: deferred  Rectal: deferred  Genitalia:  deferred    LABS:  CBC:   Lab Results   Component Value Date    WBC 3.9 09/17/2018    RBC 2.34 09/17/2018 2 weeks ago and sustained a subdural hematoma, treated conservatively by trauma and neurosurgery services. She was discharged home in stable condition but became more debilitated and fell again. In the ER, she was noted to have acute on chronic subdural hematoma with significant anemia/thrombocytopenia. She has known bone marrow suppression disease. She was admitted and transfused blood and platelets. CBC improved. Hematology and neurosurgery assisted in evaluation. Conservative care was recommended for subdural hematoma given age, dementia and family's wish for DNR CCA status. A lytic rib lesion was noted raising the possibility of lung cancer. Pulmonary was consulted. The family decided against further intervention. Hospice was consulted to provide the family information and to follow along with her care. Once approved for OG, she was discharged there for further care.     Electronically signed by Joey Bowden MD on 9/20/18 at 1:40 PM

## 2018-09-18 NOTE — CONSULTS
mg  25 mg Oral BID Abhishek Patel MD   25 mg at 09/18/18 0844    furosemide (LASIX) tablet 20 mg  20 mg Oral Daily Lennox Bonier, DO   20 mg at 09/18/18 0844    allopurinol (ZYLOPRIM) tablet 100 mg  100 mg Oral Daily Lennox Bonier, DO   100 mg at 17/30/70 4595    folic acid (FOLVITE) tablet 1 mg  1 mg Oral Daily Lennox Bonier, DO   1 mg at 09/18/18 0844    potassium chloride (KLOR-CON M) extended release tablet 10 mEq  10 mEq Oral Daily Lennox Bonier, DO   10 mEq at 09/18/18 0844    levothyroxine (SYNTHROID) tablet 50 mcg  50 mcg Oral Daily Lennox Bonier, DO   50 mcg at 09/18/18 0844    sodium chloride flush 0.9 % injection 10 mL  10 mL Intravenous 2 times per day Lennox Bonier, DO   10 mL at 09/18/18 1302    sodium chloride flush 0.9 % injection 10 mL  10 mL Intravenous PRN Lennox Bonier, DO        magnesium hydroxide (MILK OF MAGNESIA) 400 MG/5ML suspension 30 mL  30 mL Oral Daily PRN Lennox Bonier, DO        ondansetron [facility]) injection 4 mg  4 mg Intravenous Q6H PRN Lennox Bonier, DO        acetaminophen (TYLENOL) tablet 650 mg  650 mg Oral Q4H PRN Abhishek Patel MD           No Known Allergies    Review of Systems:  CONSTITUTIONAL: No weight loss, fever, chills, weakness, change in appetite, or fatigue  HEENT: Eyes: No visual loss, blurred vision, double vision or yellow sclerae. Ears, Nose, Throat: No hearing loss, sneezing, congestion, runny nose or sore throat. SKIN: No rash or itching. CARDIOVASCULAR: No chest pain, chest pressure or chest discomfort. No palpitations or edema. RESPIRATORY: No shortness of breath, wheezing, cough or sputum. GASTROINTESTINAL: No anorexia, nausea, vomiting or diarrhea. No abdominal pain or blood. GENITOURINARY: no burning on urination. NEUROLOGICAL: No headache, dizziness, syncope, paralysis, ataxia, numbness or tingling in the extremities. No change in bowel or bladder control.    MUSCULOSKELETAL: No muscle, back pain, joint pain or

## 2018-09-18 NOTE — CONSULTS
consulted. Neurosurgery was consulted- no surgical intervention planned. Family requested palliative Medicine. Palliative Medicine has been consulted to assist pt/family with establishing goals of care; code status discussion; family support. Mrs Oralia Ryan is laying in bed in no acute distress. She does have multiple bruising noted over both arms which she is quick to point out. Left eyelid droopy as well. She is oriented x 3 but [place] and slow to respond at times. She is able to answer all questions appropriately when taking time for pt to respond. Dr. 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 We discussed pt current workup issues with lung mass; still needing input from pulmonology; feels pt has not had good quality of life; pt also states \"I can't live alone\" anymore. Will await further input from pulmonology. Plan for further platelet transfusion per Oncology. Contacted son, Micheal Roque by phone; plans to meet tomorrow at 1000 for further discussion. Explained role of Palliative Medicine. States he wants to consider changing her code status to [facility] after he speaks with his mother tonight after work. Asked him to review code status options; left brochure for him to explain to his mother. We also need to know next plan for further workup. He states she has no advance directives but he is only child left so would be NOK. He would like for her to complete if able. Also states his mother has done very well at home but realizes she would likely need rehab; with hopes to get her into a independent living facility. Will follow along with further discussion tomorrow.      Past Medical History:   Diagnosis Date    CAD (coronary artery disease)     ischemic heart disease    Cataract     ou    Gout     Hyperlipidemia     Hypertension     Kidney disorder     renal insufficiency    Mild cognitive impairment     Thrombocytopenia ([address]     Thyroid disease     hypothyroidism       Past Surgical History:   Procedure Laterality Date    CARDIAC SURGERY      intra coronary stents x 2    CHOLECYSTECTOMY  1985    WRIST SURGERY  10/14    closed dislocation fx  left wrist with external fixation device         Allergies:    Patient has no known allergies. Social history:  Social History     Social History    Marital status:      Spouse name: N/A    Number of children: N/A    Years of education: N/A     Social History Main Topics    Smoking status: Unknown If Ever Smoked    Smokeless tobacco: None    Alcohol use No    Drug use: No    Sexual activity: Not Asked     Other Topics Concern    None     Social History Narrative    None         Family history:  History reviewed. No pertinent family history. ----- REVIEW OF SYSTEMS -----  CONSTITUTIONAL: Denies fever, chill or rigors, nausea or vomiting. HEENT: denies blurring of vision or double vision, +hearing problem (wears hearing aids)  RESPIRATORY: denies cough, shortness of breath, sputum expectoration, chest pain. CARDIOVASCULAR: Denies palpitation   GASTROINTESTINAL: Denies abdominal pain, diarrhea or constipation. GENITOURINARY: Denies burning urination or frequency of urination   INTEGUMENT: denies wound, rash; multiple sites of bruising  HEMATOLOGIC/LYMPHATIC: Denies lymph node swelling; + easy bruising. MUSCULOSKELETAL: Denies leg pain, joint pain, joint swelling  NEUROLOGICAL: Denies light headed, dizziness, loss of consciousness, +weakness      Vitals:   Vitals:    09/18/18 0824 09/18/18 0845 09/18/18 0945 09/18/18 1100   BP: 136/63 132/69 (!) 124/56 (!) 111/51   Pulse: 110 113 101 75   Resp: 20 20 20 20   Temp: 99 °F (37.2 °C) 98.5 °F (36.9 °C) 98.9 °F (37.2 °C) 97.1 °F (36.2 °C)   TempSrc:  Temporal Temporal Temporal   SpO2:  95%     Weight:       Height:           Physical Exam:    Gen:  Frail; elderly;  Alert, awake, following commands, denies acute distress; HEENT: Pupils equal and round; reactive to light; [place] (wears aids)  Neck: Supple  Lungs: CTA bilaterally; no audible rhonchi or wheezes noted  Heart: NSR; RRR, no murmur, rub, or gallop noted during exam  Abd: Soft, non tender, non distended, BS+  Ext: Moving all extremities, no edema, pulses present  Skin:  Warm and dry; multiple bruising of UE  Neuro: Alert, oriented x 3; following commands      Imagin/18 CT Head:   Impression:        Stable appearance of a right subdural hematoma over the convexities  measuring up to 11 mm thickness in the frontoparietal region, with a  small dependent fluid level suggesting an acute component. There is no  evidence of active hemorrhage, increasing prominence, or  new/progressive areas of hemorrhage intracranially. Mild degree of cerebral atrophy is present, and unchanged. No calvarial trauma is specifically identified, but there is some  motion artifact that could obscure subtle, nondisplaced calvarial  trauma on this exam.     CXR:   Impression:        ALERT:  THIS IS AN ABNORMAL REPORT. Findings communicated  directly with Dr. Jeny Sewell at approximately 2018 8:59 PM .  1. Left lateral mid upper lung mass noted on today's study as  measured above, seen on the previous CT scan chest 9/3/2018 with  destruction of the left third rib highly suspicious for a primary lung  malignancy versus metastatic disease versus a primary bone malignancy. 2. Prominent of the hilar regions bilaterally could be reflective of  lymphadenopathy or metastatic disease versus prominent pulmonary  vasculature. Consider correlation with CT scan chest if clinically  warranted. 3. Bibasilar infiltrate/pneumonia. 4. Vascular calcifications thoracic aorta.            Impression  Active Problems:    SDH (subdural hematoma) (HCC)    Acquired hypothyroidism    Mixed hyperlipidemia    Pancytopenia (HCC)    HTN (hypertension), benign    Lung neoplasm    Alzheimer's dementia

## 2018-09-18 NOTE — PROGRESS NOTES
(chronic), R eye yes  Wiggles fingers: Left Yes Right Yes  Wiggles toes: Left Yes    Right Yes     Hand grasp:   Left normal                             Right normal  Plantar flexion:          Left normal                                      Right normal    BP (!) 142/94   Pulse 94   Temp 97.8 °F (36.6 °C) (Oral)   Resp 20   Ht 5' 3\" (1.6 m)   Wt 165 lb (74.8 kg)   SpO2 100%   BMI 29.23 kg/m²   General appearance: appears stated age, cooperative and history of dementia  Head: Normocephalic, without obvious abnormality, atraumatic  Neck: no adenopathy, no carotid bruit, no JVD, supple, symmetrical, trachea midline and thyroid not enlarged, symmetric, no tenderness/mass/nodules  Back: symmetric, no curvature. ROM normal. No CVA tenderness. Lungs: non-labored respirations  Heart: tachycardic  Abdomen: soft, non-tender; bowel sounds normal; no masses,  no organomegaly  Extremities: extremities normal, atraumatic, no cyanosis or edema    Spine:     Spine Tenderness ROM   Cervical 0 /10 Normal   Thoracic 0 /10 Normal   Lumbar 0 /10 Normal     Musculoskeletal    Joint Tenderness Swelling ROM   Right shoulder absent absent normal   Left shoulder absent absent normal   Right elbow absent absent normal   Left elbow absent absent normal   Right wrist absent absent normal   Left wrist absent absent normal   Right hand grasp absent absent normal   Left hand grasp absent absent normal   Right hip absent absent normal   Left hip absent absent normal   Right knee absent absent normal   Left knee absent absent normal   Right ankle absent absent normal   Left ankle absent absent normal   Right foot absent absent normal   Left foot absent absent normal       CONSULTS: Neurosurgery. PROCEDURES:     INJURIES: fall SH 2 weeks ago      Active Problems:    Pancytopenia ([address]  Resolved Problems:    * No resolved hospital problems. *        Assessment/Plan:       Neuro:    History of dementia  Fall  Worsening SDH.     Monitor

## 2018-09-18 NOTE — PROGRESS NOTES
9/18/2018 2:48 PM   [facility]     Subjective:     Jeanmarie Lopez is a 80 y.o. 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 Automated exposure control, 2. Adjustment of MA  and or KV according to patient's size or 3. Use of iterative  reconstruction. There is a mixed density right subdural hematoma with both hyperdense  and isodense to hypodense blood products extending from the level of  the superior convexity inferiorly to the level of the low convexity  with estimated transverse dimension measured at approximately 1.1 cm. There is mass effect on the right frontal lobe. Area has increased  since previous exam at which time it measured approximately 0.64 cm. Moderate cerebral volume loss/atrophy. Hypodensities bilateral centrum  semiovale and periventricular regions. Bilateral basal ganglia  calcifications are stable. Vascular calcifications within the  bilateral internal carotid artery cavernous segments and the vertebral  arteries. . Moderate hyperostosis frontalis interna. Mucosal retention  cyst noted in the left posterior ethmoid air cells. Additionally patient with abnoral CT of chest with soft tissue mass with bony destruction:    LUNGS: The lungs are clear. No pleural effusion or pneumothorax is   seen. HEART: Borderline cardiomegaly is present. A vascular stent is   identified within the left anterior descending artery.    AORTA: The aorta appears to be unremarkable   MEDIASTINUM: The mediastinum is unremarkable   UPPER ABDOMEN: Please see the CT of the abdomen and pelvis report as   the study was performed concurrently with this exam.   OTHER: A 1.9 x 2.4 x 1.8 cm soft tissue mass is seen arising from the   lateral aspect of the left third rib with associated bone destruction.           Impression       1. No acute intrathoracic findings. 2. Destructive soft tissue mass arising from the left third rib   concerning for a neoplasm. Correlation with tissue sampling is   advised.    3. Borderline cardiomegaly.             Current meds:  simvastatin (ZOCOR) tablet 20 mg Nightly   metoprolol tartrate (LOPRESSOR) tablet 25 mg BID   furosemide (LASIX) tablet 20 mg Daily   allopurinol (ZYLOPRIM) tablet 254 mg Daily   folic acid (FOLVITE) tablet 1 mg Daily   potassium chloride (KLOR-CON M) extended release tablet 10 mEq Daily   levothyroxine (SYNTHROID) tablet 50 mcg Daily   sodium chloride flush 0.9 % injection 10 mL 2 times per day   sodium chloride flush 0.9 % injection 10 mL PRN   magnesium hydroxide (MILK OF MAGNESIA) 400 MG/5ML suspension 30 mL Daily PRN   ondansetron (ZOFRAN) injection 4 mg Q6H PRN   acetaminophen (TYLENOL) tablet 650 mg Q4H PRN     Todays labs:  Recent Labs      09/17/18   1940  09/18/18   1306   WBC  3.9*  5.0   HGB  6.6*  9.4*   PLT  14*  19*   BUN  23   --    CREATININE  1.0   --                                                      Objective:     Patient Vitals for the past 8 hrs:   BP Temp Temp src Pulse Resp SpO2   09/18/18 1100 (!) 111/51 97.1 °F (36.2 °C) Temporal 75 20 -   09/18/18 0945 (!) 124/56 98.9 °F (37.2 °C) Temporal 101 20 -   09/18/18 0845 132/69 98.5 °F (36.9 °C) Temporal 113 20 95 %   09/18/18 0824 136/63 99 °F (37.2 °C) - 110 20 -   09/18/18 0704 128/62 99.1 °F (37.3 °C) Oral 114 20 99 %     PE: BP (!) 110/50   Pulse 68   Temp 96.8 °F (36 °C) (Temporal)   Resp 18   Ht 5' 3\" (1.6 m)   Wt 133 lb 1.6 oz (60.4 kg)   SpO2 94%   BMI 23.58 kg/m²   General appearance: appears older than stated age and distracted  Lungs: diminished breath sounds bibasilar  Heart: regular rate and rhythm, S1, S2 normal, no murmur, click, rub or gallop  Abdomen: soft, non-tender; bowel sounds normal; no masses,  no organomegaly  Poor mentation fair recall no sig motor deficits        Impression:     Active Problems:    SDH (subdural hematoma) (HCC)    Acquired hypothyroidism    Mixed hyperlipidemia    Pancytopenia (HCC)    HTN (hypertension), benign    Lung neoplasm    Alzheimer's dementia without behavioral disturbance  Resolved Problems:    * No resolved hospital problems. *          Plan:    Will give plt with evidence of increasing subdural  Patient with sig issues and overall poor quality of life   agree with evaluation for Hospice/eol CARE

## 2018-09-18 NOTE — PROGRESS NOTES
Dr. Tod Kenny made aware of patients change in status (less oriented, following commands less, more agitated), said he will assess shortly in rounds

## 2018-09-19 NOTE — PROGRESS NOTES
Department of Neurosurgery  Progress Note    CHIEF COMPLAINT: SDH, previous fall    SUBJECTIVE:  Sleeping, aroused by voice. Confused. Denies HA, weakness, or pain. No new issues overnight. REVIEW OF SYSTEMS :  Constitutional: Negative for chills and fever. Neurological: Negative for dizziness, tremors and speech change. OBJECTIVE:   VITALS:  /60   Pulse 84   Temp 99.1 °F (37.3 °C) (Temporal)   Resp 18   Ht 5' 3\" (1.6 m)   Wt 133 lb 1.6 oz (60.4 kg)   SpO2 95%   BMI 23.58 kg/m²     PHYSICAL:  Constitutional: She appears well-developed. Pt is confused. Head: Normocephalic and atraumatic. Eyes:   R > L pupil  Left eye not reactive (baseline)  Left eye esotropia   Neck: Normal range of motion. Cardiovascular: Normal rate. Pulmonary/Chest: Effort normal.   Abdominal: Soft. She exhibits no distension. Neurological:    Pt is confused. Not oriented. Follows simple commands. Motor strength symmetric. Sensation intact to LT   Skin: Skin is warm and dry.           DATA:  CBC:   Lab Results   Component Value Date    WBC 5.3 09/19/2018    RBC 3.40 09/19/2018    HGB 9.5 09/19/2018    HCT 29.1 09/19/2018    MCV 85.6 09/19/2018    MCH 27.9 09/19/2018    MCHC 32.6 09/19/2018    RDW 15.4 09/19/2018    PLT 26 09/19/2018    MPV 9.8 09/19/2018     BMP:    Lab Results   Component Value Date     09/18/2018    K 3.1 09/18/2018     09/18/2018    CO2 16 09/18/2018    BUN 20 09/18/2018    LABALBU 2.2 09/17/2018    LABALBU 4.0 05/03/2011    CREATININE 0.9 09/18/2018    CALCIUM 9.4 09/18/2018    GFRAA >60 09/18/2018    LABGLOM 59 09/18/2018    GLUCOSE 115 09/18/2018    GLUCOSE 119 05/06/2011     PT/INR:    Lab Results   Component Value Date    PROTIME 17.4 09/17/2018    PROTIME 14.1 04/24/2011    INR 1.5 09/17/2018     PTT:    Lab Results   Component Value Date    APTT 25.2 09/17/2018   [APTT}    Current Inpatient Medications  Current Facility-Administered Medications: simvastatin (ZOCOR) following.  -Follow-up in 1 month in neurosurgery clinic with repeat head CT scan.  -Family considering hospice and end of life care. Electronically signed by Janna Gonzales PA-C on 9/19/2018 at 8:33 AM     I independently saw, evaluated, and examined the patient. Agree with plan outlined above.    Electronically signed by Urbano Dickson MD on 9/19/2018 at 5:16 PM

## 2018-09-19 NOTE — PROGRESS NOTES
Palliative Medicine   Progress Note  Clinical Nurse Specialist      Hospital Day: 3    Recommendations:  1. Psychosocial support of patient and family: met with son at bedside; son Mady Elmore  @ [phone]   2. Assist with goals of care: son wants his mother to go to rehab with plans to transition to hospice care; requested HOV since he had for her father; Oncology indicated pt to f/u as outpt. 3. Hospice consulted- son requested HOV; choiced; SSW notified  4. Code status:  DNRCCA  5. Anticipatory guidance: 80 yr old with CAD, gout, HTN, HLD, renal insufficient; dementia, and hypothyroidism   6. Symptom Management: denies acute distress    Chief Complaint: Weakness    HPI :   Epifanio Topete is a 80 y.o. female with significant past medical history of CAD, gout, HTN, HLD, renal insufficient; dementia, and hypothyroidism who presented to ED after a fall at home 9/17.  Pt had fall two weeks ago 9/3, sustaining an acute on chronic subdural hematoma and was subsequently discharged home (family declined OG). ever, labs demonstrated significant pancytopenia. Priscilla Madrigal has a prior history of use of hydroxyurea for thrombocytosis, but her records from last visit and this visit do not list it as an active medication.  Per Oncology: pt had recent bone marrow bx from 8/30 consistent with late fibrotic stage of essential thrombocytosis ;cytogenetic study with 7q deletion, nl flow study consistent with ET; pt follows with Dr. Franklin Ghosh. Platelet count 14; She was admitted and transfused platelets and RBC's.  Workup noted of SDH; also noted was Left lateral mid upper lung mass also as noted on CT of chest 9/3; now with destruction of left third rib; suspicious for primary lung malignancy vs met disease vs primary bone malignancy. Pulmonology was consulted. Neurosurgery was consulted- no surgical intervention planned. Family requested palliative Medicine.   Palliative Medicine has been consulted to assist pt/family with

## 2018-09-19 NOTE — PROGRESS NOTES
pulmonary to discuss further work up of lung/rib lesion with family (I agree that given age/co morbidities, Hospice should be considered)  5) to ECF once inpatient work up complete      Jess Roca MD  12:58 PM  9/19/2018

## 2018-09-20 NOTE — DISCHARGE SUMMARY
Physician Discharge Summary     Patient ID:  Vargas Bardales  [id number]  80 y.o.  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) and neurosurgery Maebselena Boss)    Procedures: none    Hospital Course: the patient is a 80 y.o. white woman followed by DR Char Schmitz who presents after a fall at home. She sustained a fall about 2 weeks ago and sustained a subdural hematoma, treated conservatively by trauma and neurosurgery services. She was discharged home in stable condition but became more debilitated and fell again. In the ER, she was noted to have acute on chronic subdural hematoma with significant anemia/thrombocytopenia. She has known bone marrow suppression disease. She was admitted and transfused blood and platelets. CBC improved. Hematology and neurosurgery assisted in evaluation. Conservative care was recommended for subdural hematoma given age, dementia and family's wish for DNR CCA status. A lytic rib lesion was noted raising the possibility of lung cancer. Pulmonary was consulted. The family decided against further intervention. Hospice was consulted to provide the family information and to follow along with her care. Once approved for OG, she was discharged there for further care.     Discharge Exam:  See progress note from today    Disposition: SNF    Patient Instructions:   Current Discharge Medication List      START taking these medications    Details   acetaminophen (TYLENOL) 325 MG tablet Take 2 tablets by mouth every 4 hours as needed for Fever (Temp greater than 100.5, for pain score 1-3)  Qty: 120 tablet, Refills: 3      potassium chloride (KLOR-CON) 20 MEQ packet Take 40 mEq by mouth daily  Qty: 30 each, Refills: 3         CONTINUE these medications which have CHANGED    Details   metoprolol tartrate (LOPRESSOR) 50 MG tablet Take 1 tablet by mouth 2 times daily  Qty: 60 tablet, Refills: 3         CONTINUE these medications which have NOT CHANGED    Details   folic acid (FOLVITE) 1 MG tablet Take 1 tablet by mouth daily  Refills: 3      cyanocobalamin 1000 MCG tablet Take 1,000 mcg by mouth daily      furosemide (LASIX) 20 MG tablet Take 20 mg by mouth daily      levothyroxine (SYNTHROID) 50 MCG tablet Take 50 mcg by mouth Daily. allopurinol (ZYLOPRIM) 100 MG tablet Take 100 mg by mouth daily. simvastatin (ZOCOR) 20 MG tablet Take 20 mg by mouth nightly. STOP taking these medications       potassium chloride (KLOR-CON M) 10 MEQ extended release tablet Comments:   Reason for Stopping:         famotidine (PEPCID) 20 MG tablet Comments:   Reason for Stopping:             Activity: activity as tolerated  Diet: regular diet    Follow-up with house doctor at [place] [id number] in 1 week.     Note that over 30 minutes was spent in preparing discharge papers, discussing discharge with patient, medication review, etc.    Signed:  Jazmin Johansen MD  9/20/2018  8:30 PM

## 2018-09-20 NOTE — PROGRESS NOTES
Reuben Schmitz M.D.,[facility]  Waqar Carvalho D.O., F.A.C.O.I., Elizabeth Avila M.D. Dulce Treviño M.D., Iqra Childress M.D. Progress Note          Patient:  Ita Ferreira 80 y.o. female MRN: [id number]     Date of Service: 9/20/2018    Synopsis     Lung neoplasm       Subjective        Patient was seen and examined this Patient seen in room she denies SOB or cough today . No family at bedside. She is confused to time and place. Unable to complete review of systems  24-hour events:  None   Objective     Physical Exam:  · Vitals: BP (!) 155/68   Pulse 82   Temp 97.5 °F (36.4 °C) (Oral)   Resp 17   Ht 5' 3\" (1.6 m)   Wt 133 lb 1.6 oz (60.4 kg)   SpO2 98%   BMI 23.58 kg/m²     · General Appearance: breathing is not labored    · HEENT:  NC/AT. · Neck: Trachea midline. No thyromegaly. No LAD. · Lung: Clear breath sounds B/l , no cough , breathing symmetrical    · Heart: RRR, normal S1, S2. No MRG  · Abdomen: Soft, NT, ND. BS +jennifer. · Extremities: No leg edema. Pedal pulses 2+ symmetric b/l. · Musculokeletal: No joint swelling, no muscle tenderness. ROM normal in all joints of extremities.    · Neurologic: Mental status:confused     Pertinent/ New Labs and Imaging Studies     CBC:   Lab Results   Component Value Date    WBC 6.5 09/20/2018    RBC 3.45 09/20/2018    HGB 9.8 09/20/2018    HCT 29.7 09/20/2018    PLT 22 09/20/2018    MCV 86.1 09/20/2018     BMP:    Lab Results   Component Value Date     09/20/2018    K 2.9 09/20/2018     09/20/2018    CO2 22 09/20/2018    BUN 27 09/20/2018    CREATININE 0.9 09/20/2018    GLUCOSE 113 09/20/2018    GLUCOSE 119 05/06/2011    CALCIUM 9.8 09/20/2018        Assessment and PLan      Assessment:   SDH (subdural hematoma), acute on chronic, due to fall in the setting of thrombocytopenia     Pancytopenia    Acquired hypothyroidism    Mixed hyperlipidemia    HTN (hypertension), benign    Lung neoplasm-destructive lesion

## 2018-09-20 NOTE — PLAN OF CARE
Problem: Risk for Impaired Skin Integrity  Goal: Tissue integrity - skin and mucous membranes  Structural intactness and normal physiological function of skin and  mucous membranes.    Outcome: Met This Shift      Problem: Falls - Risk of:  Goal: Will remain free from falls  Will remain free from falls   Outcome: Met This Shift    Goal: Absence of physical injury  Absence of physical injury   Outcome: Met This Shift

## 2018-09-20 NOTE — PROGRESS NOTES
Spoke with Katia Srivastava from hospice regarding patient being discharged. Katia Srivastava said she will set up meeting tomorrow with the family at [facility].

## 2018-09-20 NOTE — PROGRESS NOTES
Physical Therapy    Facility/Department: [facility] MED SURG ONC  Initial Assessment    Yuly Mason  : 1930  MRN: [id number]    Date of Service: 2018        Evaluating Therapist: Chin Appiah PT, DPT    Equipment Recommendations: To be determined    Room #: 8375O  DIAGNOSIS: Pancytopenia  PRECAUTIONS: Falls, TSM    Social:  Per chart, pt lives at [facility] of the [address] [place] [address]. Pt unable to report on social history and PLOF. Initial Evaluation  Date: 18 Treatment  Date:     Short Term/ Long Term   Goals   Was pt agreeable to Eval/treatment? Yes     Does pt have pain? No c/o pain     Bed Mobility  Rolling: NT  Supine to long sitting: Santos x2  Long sitting to supine: Santos x2  Scooting up in bed: dependent  Santos   Transfers Sit to stand: NT  Stand to sit: NT  Stand pivot: NT  -Pt refused  Santos   Ambulation   NT - Pt refused  >25 feet with Santos with AAD   Stair negotiation: ascended and descended NT  No c/o pain at this time. AM-PAC        Pt is alert and Oriented x 1. Pt oriented to self. BLE ROM is unable to assess. BLE strength is: unable to assess. Balance: Santos long sitting in bed. Sensation: No c/o numbness or tingling. Edema: No notable edema. Endurance: Poor  Chair alarm: Yes     ASSESSMENT  Pt displays functional ability as noted in the objective portion of this evaluation. Comments/Treatment:  Pt found supine in bed and was agreeable to evaluation. Pt confused throughout session. Pt found on 2 L/min of O2 and 97% SpO2 supine in bed. Pt required increased time for all activities and had latent processing. Pt refused and resisted functional mobility when attempting to help pt OOB. Pt agreeable to long sit in bed. Pt left sitting up in a chair position with call light in reach. Patient education  Pt educated on safety with functional mobility and benefits of OOB activity.      Patient response to education:   Pt verbalized understanding Pt demonstrated

## 2018-09-20 NOTE — PROGRESS NOTES
[x] Strength [x]  Cognition [x]  Functional transfers  [x] IADLs [x] Safety Awareness [x]  Endurance [x]  Fine Motor Coordination [] Balance [x] Vision/perception [x] Sensation []   Gross Motor Coordination [x] ROM []       Eval Complexity: mod   Profile and History- mod  Assessment of Occupational Performance and Identification of Deficits- high  Clinical Decision Making- mod    Treatment frequency: OT 2-4x     Plan of Care:  ADL retraining [x]   Equipment needs [x]   Neuromuscular re-education []  Energy Conservation Techniques [x]  Functional Transfer training [x]  Patient and/or Family Education [x]  Functional Mobility training [x]  Environmental Modifications []  Cognitive re-training [x]    Compensatory techniques for ADLs [x]  Splinting Needs []   Positioning to improve overall function [x]  Other: []      Rehab Potential: fair    Patient / Family Goal: wanted to rest    Short term goals  Time Frame: 2-4 days  STG #1:  Demo good follow through of 2 step simple command following for ADL completion      STG #2:  Increase grooming to min A sitting supported  STGl #3   Increase UE dressing and bathing to min A sitting supported    STG #4   Increase LE dressing and bathing to Max A    STG #5   Increase functional commode transfer to mod A with ww  STG #6   Increase safety and problem solving to fair during ADL's  STG #7   Increase functional activity tolerance to fair+ in ADL tasks      Patient and/or family understands diagnosis, prognosis and plan of care: no due to cognitive deficits    [] Malnutrition indicators have been identified and nursing has been notified to ensure a dietitian consult is ordered. Comments: Based on patient's functional performance as stated above and level of assistance needed prior to admission, this therapist believes that the patient would benefit from further skilled OT following hospital stay in an effort to increase safety, functional independence, and quality of life.

## 2018-09-20 NOTE — CARE COORDINATION
Social Work/Discharge Planning    [facility] following for information only. Current plan is for pt to discharge to [facility], when medically stable. Will need PT/OT evaluations prior to discharge. SW following, awaiting PT/OT.     Electronically signed by NOEL Plunkett on 9/20/2018 at 11:59 AM

## 2018-09-23 PROBLEM — R41.82 CHANGE IN MENTAL STATUS: Status: ACTIVE | Noted: 2018-01-01

## 2018-09-23 PROBLEM — D69.6 THROMBOCYTOPENIA (HCC): Status: ACTIVE | Noted: 2018-01-01

## 2018-09-23 PROBLEM — D68.9 COAGULOPATHY (HCC): Status: ACTIVE | Noted: 2018-01-01

## 2018-09-23 NOTE — PROGRESS NOTES
Per blood bank, patient can only get 2 units of platelets in 24 hr period without consulting pathologist.      Ivan Medellin to hang platelets, patient temp 101.5 temporal. Will call dr prior to administering

## 2018-09-23 NOTE — ED PROVIDER NOTES
performed during the hospital encounter of 44/16/87   Basic metabolic panel   Result Value Ref Range    Sodium 154 (H) 132 - 146 mmol/L    Potassium 3.9 3.5 - 5.0 mmol/L    Chloride 113 (H) 98 - 107 mmol/L    CO2 24 22 - 29 mmol/L    Anion Gap 17 (H) 7 - 16 mmol/L    Glucose 113 (H) 74 - 109 mg/dL    BUN 44 (H) 8 - 23 mg/dL    CREATININE 1.6 (H) 0.5 - 1.0 mg/dL    GFR Non-African American 31 >=60 mL/min/1.73    GFR African American 37     Calcium 10.4 (H) 8.6 - 10.2 mg/dL   CBC   Result Value Ref Range    WBC 7.9 4.5 - 11.5 E9/L    RBC 3.15 (L) 3.50 - 5.50 E12/L    Hemoglobin 8.7 (L) 11.5 - 15.5 g/dL    Hematocrit 27.1 (L) 34.0 - 48.0 %    MCV 86.0 80.0 - 99.9 fL    MCH 27.6 26.0 - 35.0 pg    MCHC 32.1 32.0 - 34.5 %    RDW 16.2 (H) 11.5 - 15.0 fL    Platelets 8 (LL) 064 - 450 E9/L    MPV NOT CALC 7.0 - 12.0 fL   Troponin   Result Value Ref Range    Troponin 0.03 0.00 - 0.03 ng/mL   CK   Result Value Ref Range    Total CK 59 20 - 180 U/L   CK MB   Result Value Ref Range    CK-MB <1.0 0.0 - 4.3 ng/mL   Brain Natriuretic Peptide   Result Value Ref Range    Pro-BNP 5,702 (H) 0 - 450 pg/mL   Amylase   Result Value Ref Range    Amylase 20 20 - 100 U/L   Lipase   Result Value Ref Range    Lipase 6 (L) 13 - 60 U/L   Hepatic Function Panel   Result Value Ref Range    Total Protein 5.9 (L) 6.4 - 8.3 g/dL    Alb 2.0 (L) 3.5 - 5.2 g/dL    Alkaline Phosphatase 195 (H) 35 - 104 U/L    ALT 31 0 - 32 U/L    AST 87 (H) 0 - 31 U/L    Total Bilirubin 1.0 0.0 - 1.2 mg/dL    Bilirubin, Direct 0.4 (H) 0.0 - 0.3 mg/dL    Bilirubin, Indirect 0.6 0.0 - 1.0 mg/dL   Magnesium   Result Value Ref Range    Magnesium 2.1 1.6 - 2.6 mg/dL   Lactic Acid, Plasma   Result Value Ref Range    Lactic Acid 2.0 0.5 - 2.2 mmol/L   Protime-INR   Result Value Ref Range    Protime 22.9 (H) 9.3 - 12.4 sec    INR 2.0    APTT   Result Value Ref Range    aPTT 28.1 24.5 - 35.1 sec   T3, Uptake   Result Value Ref Range    T3 Uptake 32.4 22.5 - 37.0 %   T4, Free reviewed. ------------------------------ ED COURSE/MEDICAL DECISION MAKING----------------------  Medications   0.9 % sodium chloride bolus (not administered)   prothrombin complex concentrate (human) (KCENTRA) infusion 3,400 Units (not administered)   0.9 % sodium chloride bolus (0 mLs Intravenous Stopped 9/23/18 0257)       Medical Decision Making:    Pt arrived via EMS for AMS. Pt is DNR-CC. IV fluids given. Imaging, EKG, and labs obtained and reviewed. Decision to admit. Spoke with son @ 2:46 AM on pt's critical condition, results, and treatment plan. He confirms pt's code status as comfort care only but does not just want to send pt back to the nursing home. He states he does not want any surgery, CPR, or intubation done. He would like fluids and medication to be given. This patient's ED course included: a personal history and physicial examination and re-evaluation prior to disposition    This patient has been closely monitored during their ED course. Re-Evaluations:           Re-evaluation. Patients symptoms show no change. Consultations:  Time: 1:09 AM.   Spoke with [facility]. Discussed case. They will admit this patient. Critical Care:  Please note that the withdrawal or failure to initiate urgent interventions for this patient would likely result in a life threatening deterioration or permanent disability. Accordingly this patient received 35 minutes of critical care time, excluding separately billable procedures. Counseling: The emergency provider has spoken with the patient and discussed todays results, in addition to providing specific details for the plan of care and counseling regarding the diagnosis and prognosis. Questions are answered at this time and they are agreeable with the plan.       --------------------------------- IMPRESSION AND DISPOSITION ---------------------------------    IMPRESSION  1. Dehydration    2. Pancytopenia ([address]    3.

## 2018-09-23 NOTE — H&P
History and Physical      CHIEF COMPLAINT:  Listless      HISTORY OF PRESENT ILLNESS:      The patient is a 80 y.o. female patient of Dr Odilon Yao who presents with listlessness. She was recently admitted here with a subdural hematoma. She was treated conservatively and seen by trauma and neurosurgery. She has a history of thrombocytopenia. She was seen by hematology. She also has a history of dementia. Hospice was also consulted for informational purposes. She was discharged to subacute rehab. Yesterday according to her son she was listless. She had not been eating. She was sent to the emergency room for evaluation. She was felt to be dehydrated, suffering from thrombcytopenia. She is admitted for IV fluids platelet transfusion and hematologic consultation.     Past Medical History:    Past Medical History:   Diagnosis Date    CAD (coronary artery disease)     ischemic heart disease    Cataract     ou    Gout     Hyperlipidemia     Hypertension     Kidney disorder     renal insufficiency    Mild cognitive impairment     Thrombocytopenia (HCC)     Thyroid disease     hypothyroidism       Past Surgical History:    Past Surgical History:   Procedure Laterality Date    CARDIAC SURGERY      intra coronary stents x 2    CHOLECYSTECTOMY  1985    WRIST SURGERY  10/14    closed dislocation fx  left wrist with external fixation device       Medications Prior to Admission:    Prescriptions Prior to Admission: acetaminophen (TYLENOL) 325 MG tablet, Take 2 tablets by mouth every 4 hours as needed for Fever (Temp greater than 100.5, for pain score 1-3)  metoprolol tartrate (LOPRESSOR) 50 MG tablet, Take 1 tablet by mouth 2 times daily  potassium chloride (KLOR-CON) 20 MEQ packet, Take 40 mEq by mouth daily  folic acid (FOLVITE) 1 MG tablet, Take 1 tablet by mouth daily  cyanocobalamin 1000 MCG tablet, Take 1,000 mcg by mouth daily  furosemide (LASIX) 20 MG tablet, Take 20 mg by mouth daily  levothyroxine (SYNTHROID) 50 without behavioral disturbance    Altered mental status    Thrombocytopenia (HCC)    Coagulopathy (HCC)       PLAN:     1. Admit for intravenous hydration and platelet transfusion    2.  Palliative care consult    Sangeetha Art D.O., [facility]  8:50 AM  9/23/2018

## 2018-09-23 NOTE — CARE COORDINATION
Social Work/Discharge Planning:      Hospice consult noted. MATIAS discussed Hospice consult with pt son Bob Rodarte. Pt son Bob Rodarte stated that he does not feel pt will need hospice services at this time. Pt son stated that pt is from [address] and he would like pt to return there at d/c. MATIAS placed a call to Caden Zamora at [place] [address] in regards to pt bed status, No answer at this time, SW left detailed message. Awaiting call back, MATIAS will follow.     Electronically signed by NOEL Smith on 9/23/2018 at 10:31 AM

## 2018-09-23 NOTE — CONSULTS
willing to continue to discuss goals of care with palliative medicine after he has spoke with oncology and medical team.  Palliative medicine will follow-up with the patient's son after further conversation with oncology in the medical team.  Patient may benefit from a neurology consult to address altered mentation. Past Medical History:    Past Medical History:   Diagnosis Date    CAD (coronary artery disease)     ischemic heart disease    Cataract     ou    Gout     Hyperlipidemia     Hypertension     Kidney disorder     renal insufficiency    Mild cognitive impairment     Thrombocytopenia (HCC)     Thyroid disease     hypothyroidism       Past Surgical History:      Past Surgical History:   Procedure Laterality Date    CARDIAC SURGERY      intra coronary stents x 2    CHOLECYSTECTOMY  1985    WRIST SURGERY  10/14    closed dislocation fx  left wrist with external fixation device       Current Medications:     sodium chloride  250 mL Intravenous Once    sodium chloride flush  10 mL Intravenous 2 times per day    levothyroxine  50 mcg Oral Daily    allopurinol  100 mg Oral Daily    simvastatin  20 mg Oral Nightly    folic acid  1 mg Oral Daily    cyanocobalamin  1,000 mcg Oral Daily    sodium chloride  250 mL Intravenous Once       PRN Medications:  sodium chloride flush, magnesium hydroxide, ondansetron, acetaminophen    IV Medications:   dextrose 5% and 0.45% NaCl with KCl 20 mEq         I&O  No intake/output data recorded. No intake or output data in the 24 hours ending 09/23/18 1107    Allergies:  Patient has no known allergies. Social History:      Social History     Social History    Marital status:       Spouse name: N/A    Number of children: N/A    Years of education: N/A     Social History Main Topics    Smoking status: Never Smoker    Smokeless tobacco: Never Used    Alcohol use No    Drug use: No    Sexual activity: Not Asked     Other Topics Concern    None 09/17/2018             IMPRESSION:   Patient Active Problem List   Diagnosis    SDH (subdural hematoma) (HCC)    Subdural hematoma (HCC)    Acquired hypothyroidism    Mixed hyperlipidemia    Pancytop[address] ([facility] [address]    HTN (hypertension), benign    Lung neoplasm    Alzheimer's dementia without behavioral disturbance    Altered mental status    Thrombocytopenia ([facility] [address]    Coagulopathy (HCC)         Assessment:/Plan:  24-year-old female who presented from nursing facility with increasing weakness and anorexia. Recently hospitalized for subdural hematoma and discharged to [facility] on 9/20. Consult for goals of care and support. Hospice also consulted. Plan:  · The patient's son does not want hospice at this time  · Continue current level of care  · Son would like to meet with oncology to discuss continued  thrombocytopenia  · Son would also like further information from medical team about  the patient's current altered mental status  · Patient may benefit from neurology evaluation  · The son is willing to continue a discussion about goals of care  with palliative medicine after he has more information about his  mother's current condition  · Palliative medicine will follow-up with son tomorrow to continue a  discussion about goals of care        Thank you for allowing us to participate in the care of this patient. Umberto Rosaless APRN-CNP, ACHPN    Time in minutes: 70  More than 50% of this interaction was related to counseling or information given regarding goals of care, code status and symptom management. Discussed patient and the plan of care with the other IDT members of Palliative Care and Dr. Rodolfo Haynes, and with family and floor nurse.

## 2018-09-23 NOTE — PROGRESS NOTES
Palliative Medicine Social Work Screen     Patient Name: Neo Gamble  Age: 80 y.o. [place] Status: Unknown    Next of Kin/ Decision-maker: son Steve Dobbins is pt's only living child           Ph: [phone]    Additional Support: None. Danitza Tilley states that a cousin has been supportive but he has little family available for support. Minor Children: None    Advanced Directives: None on file. Son Danitza Tilley is legal NOK. Confirm Code Status: Danitza Tilley changed pt's code status to [facility] today, which he feels is in line with pt's wishes. Current Goals of Care: Continue discussion with onocology and medical team regarding pt's change in condition. Danitza Tilley also would like neurology consult. Mental Health History: None known. Substance Abuse: None known. Indications of Abuse/Neglect: None known. Financial Concerns: None known. Living Situation: Pt recently at [place] [address] following hospitalizations. Danitza Tilley states that prior to her fall, pt had been living at home alone and was able to function independently including driving on her own. Pt had been very active. Physical Care Needs Met: Yes    Emotional Needs Met: Yes    Future Care Needs/Goals/Anticipatory Guidance: Continue discussion about goals of care when more information is known from oncology and possibly neurology consults. Referral Needs: None at this time. Assessment: LSW met with pt's son, Danitza Tilley, at bedside along with PM NP. Pt lethargic and minimally responsive in bed. Danitza Tilley states that hospice had been discussed with him but he is hopeful for more answers as to why pt's condition changed so dramatically in last few weeks. Pt had been active and functioning prior to fall a few weeks ago. She is now minimally responsive and had been reluctant to eat.   Danitza Tilley states that he understands pt has a lesion on her lung - he states he would not necessarily want to pursue treatment but does wish to have more information from medical

## 2018-09-24 PROBLEM — E43 SEVERE PROTEIN-CALORIE MALNUTRITION (HCC): Status: ACTIVE | Noted: 2018-01-01

## 2018-09-24 PROBLEM — D64.9 ANEMIA: Chronic | Status: ACTIVE | Noted: 2018-01-01

## 2018-09-24 PROBLEM — D64.9 ANEMIA: Status: ACTIVE | Noted: 2018-01-01

## 2018-09-24 NOTE — PROGRESS NOTES
and coughs. Oncology saw- overall poor prognosis. Son wishes to wait until further workup is complete before further decisions made. Cultures pending. [facility]  Plan for formal swallow study today. Will follow along. Ralph Elizalde  MSN, APRN-CNS, [facility] 9/24/2018 10:13 AM    Time in minutes spent: 25    More than 50% of this interaction was related to counseling or information given r/t disease process; plan of care; and code status.     Discussed patient and the plan of care with the other IDT members of the Palliative Care Team as well as patient, family, and staff nurse.        Thank you for allowing us to work with you in the care of this patient.

## 2018-09-24 NOTE — PROGRESS NOTES
9/24/2018 9:15 AM   [facility]     Subjective:   Lio Gupta is a 80 y.o. 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, nl flow study consistant with ET end stage on transfusional support        Current meds:  potassium chloride 40 mEq in dextrose 5 % 1,000 mL infusion Continuous   sodium chloride flush 0.9 % injection 10 mL 2 times per day   sodium chloride flush 0.9 % injection 10 mL PRN   magnesium hydroxide (MILK OF MAGNESIA) 400 MG/5ML suspension 30 mL Daily PRN   ondansetron (ZOFRAN) injection 4 mg Q6H PRN   levothyroxine (SYNTHROID) tablet 50 mcg Daily   allopurinol (ZYLOPRIM) tablet 100 mg Daily   simvastatin (ZOCOR) tablet 20 mg Nightly   folic acid (FOLVITE) tablet 1 mg Daily   vitamin B-12 (CYANOCOBALAMIN) tablet 1,000 mcg Daily   acetaminophen (TYLENOL) tablet 650 mg Q4H PRN   acetaminophen (TYLENOL) suppository 650 mg Q4H PRN     Todays labs:  Recent Labs      09/23/18   1341  09/24/18   0452   WBC  5.0  4.6   HGB  8.6*  8.5*   PLT  16*  22*   BUN   --   39*   CREATININE   --   1.0                                                     Objective:     Patient Vitals for the past 8 hrs:   BP Temp Temp src Pulse Resp   09/24/18 0815 (!) 123/57 100.7 °F (38.2 °C) Temporal 114 18     Off floor for Ct scan         Impression:     Active Problems:    SDH (subdural hematoma) (HCC)    Acquired hypothyroidism    Mixed hyperlipidemia    HTN (hypertension), benign    Lung neoplasm    Thrombocytopenia (HCC)    Anemia  Resolved Problems:    * No resolved hospital problems.  *          Plan:     Case discussed with son   Currently on transfusional support   overall prognosis poor

## 2018-09-24 NOTE — PLAN OF CARE
Problem: Risk for Impaired Skin Integrity  Goal: Tissue integrity - skin and mucous membranes  Structural intactness and normal physiological function of skin and  mucous membranes. Intervention: SKIN ASSESSMENT    Twyla's fam had conf w medical team early in shift. Afterward, we reviewed items of key info w them ( brain ct, labs, etc ) in effort to fully inform and also to foster realistic expectations. ackn underst.    Ongoing emo support. Bobbi Rader w limited ability to focus on tasks or verbalize. Declines offered food/fluids. Pushes away pills. occas accepts moistened mouth swab.

## 2018-09-24 NOTE — PLAN OF CARE
Problem: Nutrition  Goal: Optimal nutrition therapy  Outcome: Ongoing  Nutrition Problem: Severe malnutrition, in context of acute illness or injury  Intervention: Food and/or Nutrient Delivery: Continue current diet, Start ONS (ensure TID )  Nutritional Goals: Consume 50% or more of most meals/ONS

## 2018-09-24 NOTE — PROGRESS NOTES
SPEECH/LANGUAGE PATHOLOGY  BEDSIDE SWALLOWING EVALUATION    PATIENT NAME:  Rosibel Tan      :  1930      TODAY'S DATE:  2018    SUMMARY OF EVALUATION     DYSPHAGIA DIAGNOSIS:  Moderate-marked oral dysphagia secondary to alertness      DIET RECOMMENDATIONS: Dysphagia 1 diet (pureed foods) with regular consistency liquids. FEEDING RECOMMENDATIONS:    []No assistance required   []Stand by assist    []Full assistance required  []Set up required    []Supervision with all PO intake     []Double swallow    []Chin tuck   []Multiple swallow     []Small bites/sips      []Alternate solids / liquids  []Check for oral pocketing   []No straw    []Throat clear       []Spoon sip liquids   []Effortful swallow   [x]Feed only when alert   [] Malnutrition indicators have been identified and nursing has been notified to ensure a dietary consult is ordered.       THERAPY RECOMMENDATIONS:       []  Therapy is not recommended       []  Therapy is recommended to:     []  Improve oral motor strength and range of motion     []  Improve tongue base retraction      []  Improve laryngeal strength and range of motion     []  Mealtime assessment of patient's tolerance of prescribed diet     []  Repeat bedside swallow study in    [x]  Video Swallowing Evaluation is recommended when pt more alert and requires a Physician order                PROCEDURE     Consistencies Administered During the Evaluation   Liquids: [x]  Thin    []  Nectar   []  Honey   Solids:  [x]  Pureed/Pudding   []  Soft Solid   []  Cookie   Other:      Method of Intake:   [x]  Cup  [x]  Spoon  [x]  Straw (pt unable to use) []  Self Fed  [x]  Fed by Clinician     Position:   [x]  Upright seated ([x]In bed [] in chair)   []  Reclined in bed                   RESULTS     Oral Stage:   []  Normal   []  Functional  [x]  Abnormal     []  Dentition: ([]  natural  []  missing teeth  []  edentulous  []  partials  [] dentures )    []  Inadequate labial seal resulting stated goals:   []  Treatment goals discussed with []  patient/  []  family. []  The []  patient/ []  family understand the diagnosis, prognosis and plan of care. [x]The admitting diagnosis and active problem list, as listed below have been reviewed prior to initiation of this evaluation.      ADMITTING DIAGNOSIS: Change in mental status [R41.82]     ACTIVE PROBLEM LIST:   Patient Active Problem List   Diagnosis    SDH (subdural hematoma) (HCC)    Acquired hypothyroidism    Mixed hyperlipidemia    HTN (hypertension), benign    Lung neoplasm    Encounter for palliative care    Thrombocytopenia ([facility] [address]    Anemia    Severe protein-calorie malnutrition (HCC)    Altered mental status

## 2018-09-25 NOTE — PLAN OF CARE
Problem: Confusion - Acute:  Goal: Mental status will be restored to baseline  Mental status will be restored to baseline     Outcome: Ongoing

## 2018-09-25 NOTE — PROGRESS NOTES
Palliative Medicine  Progress Note    Spoke with patient's nurse. Son was in today however he did not have any questions or concerns. He was waiting for work up regarding AMS. Cultures done are pending. Will continue to follow. Guzman SIDDIQI-CNP  Palliative Medicine    Note: This report was completed using computer[employer] voiced recognition software. Every effort has been made to ensure accuracy; however, inadvertent computerized transcription errors may be present.

## 2018-09-26 NOTE — PROGRESS NOTES
fluids secondary to correct electrolytes  2) empiric IV antibiotics  3) ID consult to assist with infection management  4) transfuse platelets  5) lopressor for heart rate control  6) repeat CT head to re evaluate her subdural hematoma given worsening mental status and thrombocytopenia  7) overall prognosis remains poor given her age/co morbidities    Discussed with son at length at bedside.  Discussed with DR Sonia Dominguez this Mitzy Porras MD  11:06 AM  9/26/2018

## 2018-09-26 NOTE — PLAN OF CARE
DR Helen Rausch NOTIFIED THAT PT HAS BEEN HYPOTENSIVE SINCE FIRST DOSE OF LOPRESSOR 5MG IV,WILL AWAIT NEW ORDERS.

## 2018-09-26 NOTE — PROGRESS NOTES
9/26/2018 11:10 AM   [facility]     Subjective:   Pablo Arrington is a 80 y.o. patient of Dr Brad Jimenez with longstanding hx of ET treated with hydrea with recent progression to pancytopenia Bone marrow bx from 8/30 consistant with late fibrotic stage of essential Thrombocytosis  cytogentic study with 7q deletion, nl flow study consistant with ET end stage on transfusional support    Difficult to arouse   sig fevers last 12 hrs   urine pos e coli    Current meds:    0.45 % sodium chloride infusion Continuous   metoprolol (LOPRESSOR) injection 5 mg Q6H   cefTRIAXone (ROCEPHIN) 1 g in sterile water 10 mL IV syringe Q24H   pantoprazole (PROTONIX) injection 40 mg Daily   sodium chloride flush 0.9 % injection 10 mL 2 times per day   sodium chloride flush 0.9 % injection 10 mL PRN   magnesium hydroxide (MILK OF MAGNESIA) 400 MG/5ML suspension 30 mL Daily PRN   ondansetron (ZOFRAN) injection 4 mg Q6H PRN   acetaminophen (TYLENOL) tablet 650 mg Q4H PRN   acetaminophen (TYLENOL) suppository 650 mg Q4H PRN     Todays labs:    Recent Labs      09/24/18   0452  09/25/18   0510  09/26/18   0451   WBC  4.6  3.9*  4.4*   HGB  8.5*  7.7*  8.2*   PLT  22*  11*  8*   BUN  39*  30*  32*   CREATININE  1.0  1.0  1.1*                                                     Objective:     Patient Vitals for the past 8 hrs:   BP Temp Temp src Pulse Resp SpO2   09/26/18 1000 (!) 109/53 101.5 °F (38.6 °C) Temporal 124 24 96 %   09/26/18 0839 (!) 150/67 101.6 °F (38.7 °C) - 119 20 -   09/26/18 0806 (!) 150/67 101.5 °F (38.6 °C) Temporal 119 20 96 %   09/26/18 0740 (!) 150/67 101.6 °F (38.7 °C) Temporal 119 20 96 %             Impression:     Active Problems:    SDH (subdural hematoma) (HCC)    Acquired hypothyroidism    Mixed hyperlipidemia    HTN (hypertension), benign    Lung neoplasm    Thrombocytopenia (HCC)    Anemia    Severe protein-calorie malnutrition (HCC)    Altered mental status  Resolved Problems:    * No resolved hospital problems.

## 2018-09-27 NOTE — PLAN OF CARE
Problem: Risk for Impaired Skin Integrity  Goal: Tissue integrity - skin and mucous membranes  Structural intactness and normal physiological function of skin and  mucous membranes.    Outcome: Not Met This Shift

## 2018-09-27 NOTE — PROGRESS NOTES
decided to transition patient to [facility] and would consider hospice if further decline. Patient was discharged to [place] [address] on 9/20. Patient presented with increasing weakness, dehydration and thrombocytopenia from the nursing facility. She is currently a DNR CCA. She was treated with IV fluid and platelet transfusion. Hematology following. Medical service ordered a hospice consult for patient. Per  notes, the son is not interested in hospice at this time. Palliative Care Encounter:   9/27 CT of head notes evolving SDH. Pt minimally responsive; not vocal or answering questions today. Not taking any po; when taking po earlier this week was coughing and choking. Contacted son Malu Strickland by phone (is a work); asked to set up meeting tomorrow at 1000 to again discuss recommendation for hospice and comfort care. IV fluids are continued as well as antibiotics. VERY POOR prognosis. Oncology is even recommending hospice. 9/24 - Pt seen; continues on oxygen per n/c 3L; SPO2 97%. Hgb 8.5. Blood , urine and resp cultures pending. Pt is much more altered than when seen last week. Isn't able to tell me where she is; offered water; pt choking and unable to swallow. Staff report has been refusing food today. Plan for formalized swallow study. CT of Abd notes Moderate mural thickening identified involving duodenum could be due to focal peristaltic activity or could be seen with underlying duodenitis. Staff report son was here earlier and was updated; wants to have workup completed before any further decisions are made. [address]. Overall prognosis poor. Will follow along. 9/23 Pt was seen yesterday; PM met with the patient's son and palliative medicine LSW at bedside. The patient is lethargic and minimally responsive; not following commands and nonverbal.  Son states that the patient has been declining since her head injury and has noted a recent weight loss of approximately 20 pounds.   He states that his

## 2018-09-27 NOTE — PROGRESS NOTES
continue IV antibiotics  3) follow CBC  4) defer further transfusions to hematology  5) agree with hematology that prognosis poor and Hospice should be consulted. However, after my discussion with son yesterday and today, he is not ready to transition her to hospice yet but is still agreeable to DNR CCA status. Discussed with son at bedside.       Cristel Mcintosh MD  9:29 AM  9/27/2018

## 2018-09-27 NOTE — PROGRESS NOTES
Nutrition Assessment    Type and Reason for Visit: Reassess    Nutrition Recommendations: Patient lethargic today, spoke w/ son bedside. No new recommendations at this time . Will continue to monitor patient. Malnutrition Assessment:  · Malnutrition Status: Meets the criteria for severe malnutrition  · Context: Acute illness or injury  · Findings of the 6 clinical characteristics of malnutrition (Minimum of 2 out of 6 clinical characteristics is required to make the diagnosis of moderate or severe Protein Calorie Malnutrition based on AND/ASPEN Guidelines):  1. Energy Intake-Less than or equal to 50%, greater than 7 days    2. Weight Loss-7.5% loss or greater, in 1 week  3. Fat Loss-Moderate subcutaneous fat loss, Orbital  4. Muscle Loss-Moderate muscle mass loss, Temples (temporalis muscle), Clavicles (pectoralis and deltoids)  5. Fluid Accumulation-No significant fluid accumulation,    6.   Strength-Not measured    Nutrition Diagnosis:   · Problem: Severe malnutrition, in context of acute illness or injury  · Etiology: related to Catabolic illness     Signs and symptoms:  as evidenced by Diet history of poor intake, Intake 0-25%, Weight loss greater than or equal to 2% in 1 week, Moderate loss of subcutaneous fat, Moderate muscle loss    Nutrition Assessment:  · Subjective Assessment: pt asleep at bedside, spoke with son about pts intake   · Nutrition-Focused Physical Findings: +I/o,s, oriented x 1, pt lethargic, tremors, +1 edema, to extremities, distended abd, audible bs,   · Wound Type: None  · Current Nutrition Therapies:  · Oral Diet Orders: General, Dysphagia 1 (Pureed)   · Oral Diet intake: 1-25%  · Oral Nutrition Supplement (ONS) Orders: Standard High Calorie Oral Supplement  · ONS intake: 1-25%  · Anthropometric Measures:  · Ht: 5' 3\" (160 cm)   · Current Body Wt: 124 lb (56.2 kg) (9/27 actual bedscale )  · Usual Body Wt: 133 lb (60.3 kg) (9/17/2018 actual on last admit per EMR )  · % Weight

## 2018-09-28 NOTE — PROGRESS NOTES
LSW for Palliative Medicine met with pt's son, Guadalupe Sam, outside of room along with PM CNS. Guadalupe Sam states that he understands pt's condition and would elect comfort care at this time. Reviewed hospice benefit, philosophy and services. Guadalupe Sam stated he is making decision whether to have pt return to [address] [place] [address] or return home. Guadalupe Sam had been private paying for a caregiver before this to assist pt while he was at work. He is planning to have assistance from caregiver if pt returns home with him. Guadalupe Sam understands that pt's time is limited, is adjusting as pt had been very active several months ago. Guadalupe Sam is receptive to discussing more with hospice today. Advised of Hospice choices, [address]. Psychosocial support provided. Referral made to [facility].

## 2018-09-28 NOTE — PROGRESS NOTES
Liaison Informational Visit Note      Referral received from Palliative medicine           Call back number        Patient Name: Jayesh Alvarenga   :  1930  MRN:  [id number]    Admit date:  2018    Admitted from: rehab  Hospital Admitting Physician:  Darshana Bang MD   PCP:  Breann Howell MD      Primary Insurance: Payor: [place] /  /  /    Secondary Insurance:  unknown    Emergency Contact:      Contact/Relation:  Candi Cage /  son       Phone:   [phone]    Contact/Relation:   /     Phone:     Advance Directive  Advance directives received No  Patient has NOT completed an advance directive   Discussed with: Family member  DPOA-HC Name-Relation:Healthcare Agent's Name: Son   Phone:Healthcare Agent's Phone Number: [phone]      Terminal Diagnosis SDH as confirmed by Dr. Thad Hassan  Stroke per Palliative medicine consult order     Current Hospital Problem List:   Patient Active Problem List   Diagnosis Code    SDH (subdural hematoma) ([address] D90.5N1D    Acquired hypothyroidism E03.9    Mixed hyperlipidemia E78.2    HTN (hypertension), benign I10    Lung neoplasm D49.1    Encounter for palliative care Z51.5    Thrombocytopenia ([address] D69.6    Anemia D64.9    Severe protein-calorie malnutrition ([address] E43    Altered mental status R41.82       Code Status Order: DNR-CC     Past Medical History:        Diagnosis Date    CAD (coronary artery disease)     ischemic heart disease    Cataract     ou    Gout     Hyperlipidemia     Hypertension     Kidney disorder     renal insufficiency    Mild cognitive impairment     Thrombocytopenia ([address]     Thyroid disease     hypothyroidism     Past Surgical History:        Procedure Laterality Date    CARDIAC SURGERY      intra coronary stents x 2    CHOLECYSTECTOMY  1985    WRIST SURGERY  10/14    closed dislocation fx  left wrist with external fixation device       Allergies:  Patient has no known allergies.     Consult received,

## 2018-09-28 NOTE — PROGRESS NOTES
Subjective: The patient is obtunded. Does not wake to voice today. Not eating/drinking at all the last few days per nursing. No other acute overnight events. Objective:    /60   Pulse 95   Temp 99.1 °F (37.3 °C) (Temporal)   Resp 20   Ht 5' 3\" (1.6 m)   Wt 123 lb (55.8 kg)   SpO2 98%   BMI 21.79 kg/m²       Current medications that patient is taking have been reviewed. Heart:  Regular rhythm, regular rate, no murmurs, gallops, or rubs. Lungs:  CTA bilaterally, no wheeze, rales or rhonchi  Abd: bowel sounds present, soft, remains diffusely tender on palpation, non distended, no masses  Extrem:  No clubbing, cyanosis, or edema    CBC:   Lab Results   Component Value Date    WBC 4.1 09/28/2018    RBC 2.41 09/28/2018    HGB 6.6 09/28/2018    HCT 21.1 09/28/2018    MCV 87.6 09/28/2018    MCH 27.4 09/28/2018    MCHC 31.3 09/28/2018    RDW 16.8 09/28/2018    PLT 8 09/28/2018    MPV 11.6 09/28/2018     BMP:    Lab Results   Component Value Date     09/28/2018    K 4.1 09/28/2018     09/28/2018    CO2 19 09/28/2018    BUN 40 09/28/2018    CREATININE 1.1 09/28/2018    CALCIUM 9.3 09/28/2018    GFRAA 57 09/28/2018    LABGLOM 47 09/28/2018    GLUCOSE 76 09/28/2018         Assessment:    Patient Active Problem List   Diagnosis    SIRS/sepsis-secondary to UTI (E coli and Klebsiella)    Acute metabolic encephalopathy-secondary to above    SDH (subdural hematoma), chronic    Thrombocytopenia-plt count dropping again    Anemia of chronic disease-H/H lower today    Acquired hypothyroidism    Mixed hyperlipidemia    HTN (hypertension), benign    Lung neoplasm, likely malignant       Plan:    1) continue IV fluids  2) continue supportive care. Would not transfuse any further as patient has not responded to transfusions and her condition seems terminal.  I discussed this with her son, Deion Hugo, via phone. He is coming in soon to discuss further care with palliative care.   He seems receptive

## 2018-09-28 NOTE — PLAN OF CARE
Problem: Falls - Risk of:  Goal: Will remain free from falls  Will remain free from falls      Outcome: Met This Shift    Goal: Absence of physical injury  Absence of physical injury      Outcome: Met This Shift      Problem: Injury - Risk of, Physical Injury:  Goal: Absence of physical injury  Absence of physical injury      Outcome: Met This Shift    Goal: Will remain free from falls  Will remain free from falls      Outcome: Met This Shift

## 2018-09-28 NOTE — PROGRESS NOTES
Tomi a 80 y. o.female  who presented from nursing facility with increasing weakness and anorexia. Recently hospitalized for subdural hematoma and discharged to [facility] on 9/20. Consult for goals of care and support. Hospice also consulted. Son does not want hospice at this time. Continue current level of care. [facility]. Would like input from Oncology/ medical team as to cause of acute onset of further mental status changes. Consider Neuro consult. 9/24  Pt remains very weak; lethargic. Refused food today; asking for water but chokes and coughs. Oncology saw- overall poor prognosis. Son wishes to wait until further workup is complete before further decisions made. Cultures pending. [facility]  Plan for formal swallow study today. Will follow along. 9/27 minimally responsive; not taking anything po; unable to swallow  Oncology recommending hospice  Spoke with son Malu Strickland- will meet tomorrow at 1000  Recommending comfort measures and [facility]; will discuss further tomorrow    9/28  Pt not responsive; in no acute distress; met with son Malu Strickland; discussed pt being in a terminal state - he is now in agreement with comfort measures only and hospice  He was offered choices. Wants RENETTA who met with son last admission. He is decided d/c plan whether home vs facility. States he has caregiver who can help at home. [facility]  Hospice consulted- RENETTA to follow      Dianelys Elise  MSN, APRN-CNS, [facility] 9/28/2018 9:20 AM    Time in minutes spent: 35    More than 50% of this interaction was related to counseling or information given r/t disease process; plan of care; and code status.     Discussed patient and the plan of care with the other IDT members of the Palliative Care Team as well as patient, family, and staff nurse.        Thank you for allowing us to work with you in the care of this patient.

## 2018-09-29 NOTE — PROGRESS NOTES
weakness, dehydration and thrombocytopenia from the nursing facility. She is currently a DNR CCA. She was treated with IV fluid and platelet transfusion. Hematology following. Medical service ordered a hospice consult for patient. Per  notes, the son is not interested in hospice at this time. Palliative Care Encounter:   9/28  Pt seen along with PM SSW. Pt has continued to decline; no po intake in days. Pt is not responding; no plans to transfuse further. Hgb 6.6. Recommended hospice and comfort measures to salvador Avila. He is now in agreement to transition to hospice care. Choices were offered-agrees to [facility]; has met with son on past admission. Oncology recommending hospice at this point as well. Orders placed. 9/27 CT of head notes evolving SDH. Pt minimally responsive; not vocal or answering questions today. Not taking any po; when taking po earlier this week was coughing and choking. Contacted salvador Avila by phone (is a work); asked to set up meeting tomorrow at 1000 to again discuss recommendation for hospice and comfort care. IV fluids are continued as well as antibiotics. VERY POOR prognosis. Oncology is even recommending hospice. 9/24 - Pt seen; continues on oxygen per n/c 3L; SPO2 97%. Hgb 8.5. Blood , urine and resp cultures pending. Pt is much more altered than when seen last week. Isn't able to tell me where she is; offered water; pt choking and unable to swallow. Staff report has been refusing food today. Plan for formalized swallow study. CT of Abd notes Moderate mural thickening identified involving duodenum could be due to focal peristaltic activity or could be seen with underlying duodenitis. Staff report son was here earlier and was updated; wants to have workup completed before any further decisions are made. [address]. Overall prognosis poor. Will follow along. 9/23 Pt was seen yesterday; PM met with the patient's son and palliative medicine LSW at bedside. The patient is lethargic and minimally responsive; not following commands and nonverbal.  Son states that the patient has been declining since her head injury and has noted a recent weight loss of approximately 20 pounds. He states that his mother is refusing to eat. He understands that his mother has a lung lesion that is most likely a cancer. He wants to better understand her current medical condition before he makes further decisions about goals of care. He is waiting to speak with oncology about the patient's thrombocytopenia. He also has many questions about his mother's altered mental status. Code  DNR CCA; no heroics at end of life. RECOMMENDATION - Patient may benefit from a neurology consult to address altered mentation. Physical Exam:  Vitals:    /60   Pulse 94   Temp 99.6 °F (37.6 °C) (Temporal)   Resp 24   Ht 5' 3\" (1.6 m)   Wt 123 lb (55.8 kg)   SpO2 92%   BMI 21.79 kg/m²   · General Appearance: frail; weak; confused; minimally responsive to me today. · Eyes: equal and round  · Neck: supple  · Lungs:  Bibasilar inspiratory rales noted; diminished breath sounds b/l    · Heart: Regular rate and rhythm with Gr II/VI BINDU noted. · Abdomen: Soft, non-tender, bowel sounds active all four quadrants  · Extremities:  Extremities normal, atraumatic, Pulses equal bilaterally; pedal pulses 2+, minimal non-pitting edema to the extremities noted. · Skin: warm and dry  · Neurologic: lethargic; minimally responsive; does not  following commands       ALLERGIES:Patient has no known allergies. Imagin/26 CT of head:   Impression:        Evolving chronic right subdural hemorrhage. No evidence of  active hemorrhage.          Impression:  Active Problems:    SDH (subdural hematoma) (HCC)    Acquired hypothyroidism    Mixed hyperlipidemia    HTN (hypertension), benign    Lung neoplasm    Thrombocytopenia (HCC)    Anemia    Severe protein-calorie malnutrition (HCC)    Altered mental status  Resolved Problems:    * No resolved hospital problems. *        Assessment/Plan :   Marti Tellez a 80 y. o.female  who presented from nursing facility with increasing weakness and anorexia. Recently hospitalized for subdural hematoma and discharged to [facility] on 9/20. Consult for goals of care and support. Hospice also consulted. SonFeels that it's time for hospice care for his mother. Hoping for hospice see the patient today. Comfort measures were given. Patient does not speak and he'll get any history from the patient at this time. 9/24  Pt remains very weak; lethargic. Refused food today; asking for water but chokes and coughs. Oncology saw- overall poor prognosis. Son wishes to wait until further workup is complete before further decisions made. Cultures pending. [facility]  Plan for formal swallow study today. Will follow along. 9/27 minimally responsive; not taking anything po; unable to swallow  Oncology recommending hospice  Spoke with son Sage Peace- will meet tomorrow at 1000  Recommending comfort measures and [facility]; will discuss further tomorrow    9/28  Pt not responsive; in no acute distress; met with son Sage Peace; discussed pt being in a terminal state - he is now in agreement with comfort measures only and hospice  He was offered choices. Wants RENETTA who met with son last admission. He is decided d/c plan whether home vs facility. States he has caregiver who can help at home. [facility]  Hospice consulted- RENETTA to follow    9/29  Patient remains lethargic today. Had the pleasure of discussing code status with the patient's son, Sage Peace at [phone], who feels that it's time for hospice care at this time. He is also asking for some comfort measures as well as the RN on the floor. Roxanol 5 mg by mouth every 4 hours when necessary pain or anxiety or dyspnea. Also Ativan 0.5 mg IV every 6 age when necessary for worsening anxiety and dyspnea.  Further discussions with hospice regarding

## 2018-09-29 NOTE — PROGRESS NOTES
input(s): LABURIN in the last 72 hours. No results for input(s): CULTRESP in the last 72 hours. No results for input(s): WNDABS in the last 72 hours. Radiology :  CT Head WO Contrast   Final Result   Evolving chronic right subdural hemorrhage. No evidence of   active hemorrhage. CT ABDOMEN PELVIS WO CONTRAST Additional Contrast? None   Final Result      1. Moderate mural thickening identified involving duodenum could be   due to focal peristaltic activity or could be seen with underlying   duodenitis. Please correlate exam findings and history. .   2. Negative for Nephrolithiasis or hydronephrosis. 3. Moderate severe degenerative changes of the lumbar spine.                URINARY CATHETER OUTPUT (Sanchez):  Urethral Catheter-Output (mL): 200 mL    Assessment and Plan:         UTI with Ecoli and K pneumoniae  Encephalopathy  Subdural hematoma  Pancytopenia  Lung neoplasm, likley malignant        Plan  - continue with Ceftriaxone 2gm q daily, can be changed later to PO abx, if she tolerates PO abx          Electronically signed by Sudha Major MD on 9/29/2018 at 2:03 PM

## 2018-10-01 NOTE — PROGRESS NOTES
This RN provided witness of death verification, of no respirations, pulse, or blood pressure. Calls placed to Jose Juan Sims, reference number [id number], , Attending Physician, Son Benito Heart, Nurse superviser, and FINN Blum. Deferred calling  home, [address] [place] [address] [address], until family arrives. Nurse Aide cleaned and prepared body. All lines removed.       [address] [place] [address] [address] notified upon family request.

## 2018-10-04 NOTE — DISCHARGE SUMMARY
Patient was admitted with a intracranial hemorrhage. Due to her overall poor prognosis with multiple comorbidities she was made DNR CC. Comfort measures were enacted.  Patient passed peacefully

## 2018-10-08 LAB
EKG ATRIAL RATE: 98 BPM
EKG P AXIS: 18 DEGREES
EKG P-R INTERVAL: 138 MS
EKG Q-T INTERVAL: 344 MS
EKG QRS DURATION: 106 MS
EKG QTC CALCULATION (BAZETT): 439 MS
EKG R AXIS: -13 DEGREES
EKG T AXIS: 13 DEGREES
EKG VENTRICULAR RATE: 98 BPM

## 2018-11-01 PROBLEM — R31.9 URINARY TRACT INFECTION WITH HEMATURIA: Status: ACTIVE | Noted: 2018-11-01

## 2018-11-01 PROBLEM — D61.818 PANCYTOPENIA (HCC): Status: ACTIVE | Noted: 2018-11-01

## 2018-11-01 PROBLEM — D68.9 COAGULOPATHY (HCC): Status: ACTIVE | Noted: 2018-11-01

## 2018-11-01 PROBLEM — N39.0 URINARY TRACT INFECTION WITH HEMATURIA: Status: ACTIVE | Noted: 2018-11-01

## 2018-11-01 PROBLEM — E86.0 DEHYDRATION: Status: ACTIVE | Noted: 2018-11-01

## 2020-10-01 NOTE — CONSULTS
TRAUMA HISTORY & PHYSICAL  Resident   9/3/2018  3:49 PM    Chief Complaint   Patient presents with    Fall     fall 4 days ago, +SDH with 4mm shift         HPI: Ita Ferreira is a 80 y.o. female who presents after fall [address] hitting head on bedroom wall, -LOC, able to get back up after. Fall occurred 9-10 days ago. Seen at [facility] and had CT Head showing SDH and CT C-spine which was unremarkable. Hx of multiple falls recently. Hgb 7.8 at OSH. States follows at [facility], recently had Bone Marrow Bx to examine her \"blood condition\", states she has very low counts.       PRIMARY SURVEY    AIRWAY:   Airway normal  EMS ETT Absent   Noisy respirations Absent   Retractions: Absent   Vomiting/bleeding: Absent     BREATHING:    Midaxillary breath sound left:  Present  Midaxillary breath sound right: Present  Cough sound intensity:  Good  Respiratory rate: 16  FiO2: RA  SpO2: 100  SMI:     CIRCULATION:   Femerol pulse rate: within normal limits  Femerol pulse intensity: present  Palpebral conjunctiva: Pink     BP      P     SpO2       T      F    Patient Vitals for the past 8 hrs:   BP Temp Temp src Pulse Resp SpO2 Weight   09/03/18 1411 (!) 144/80 97.4 °F (36.3 °C) Temporal 84 16 99 % 165 lb (74.8 kg)       FAST EXAM: Not performed    Central Nervous System    GCS Initial 15 minutes   Eye  Motor  Verbal 4 - Opens eyes on own  6 - Follows simple motor commands  5 - Alert and oriented 4 - Opens eyes on own  6 - Follows simple motor commands  5 - Alert and oriented     Neuromuscular blockade: No  Pupil size:  Left 3 mm    Right 3 mm  Pupil reaction: Yes  Wiggles fingers: Left Yes Right Yes  Wiggles toes: Left Yes    Right Yes    Hand grasp:   Left normal       Right normal  Plantar flexion: Left normal     Right normal  Loss of consciousness: No    History Obtained From:  patient, family member - son  Private Medical Doctor: Kendra Barone MD    Pre-exisiting Medical History:  yes    Conditions:  has a past medical history of CAD (coronary artery disease); Cataract; Gout; Hyperlipidemia; Hypertension; Kidney disorder; Mild cognitive impairment; Thrombocytopenia ([facility] [address] and Thyroid disease. Medications:   Prior to Admission medications    Medication Sig Start Date End Date Taking? Authorizing Provider   folic acid (FOLVITE) 1 MG tablet Take 1 tablet by mouth daily 8/14/18  Yes Historical Provider, MD   potassium chloride (KLOR-CON M) 10 MEQ extended release tablet Take 1 tablet by mouth daily 8/14/18  Yes Historical Provider, MD   cyanocobalamin 1000 MCG tablet Take 1,000 mcg by mouth daily   Yes Historical Provider, MD   furosemide (LASIX) 20 MG tablet Take 20 mg by mouth daily   Yes Historical Provider, MD   metoprolol tartrate (LOPRESSOR) 25 MG tablet Take 12.5 mg by mouth 2 times daily   Yes Historical Provider, MD   levothyroxine (SYNTHROID) 50 MCG tablet Take 50 mcg by mouth Daily. Yes Historical Provider, MD   allopurinol (ZYLOPRIM) 100 MG tablet Take 100 mg by mouth daily. Yes Historical Provider, MD   famotidine (PEPCID) 20 MG tablet Take 20 mg by mouth 2 times daily as needed    Yes Historical Provider, MD   simvastatin (ZOCOR) 20 MG tablet Take 20 mg by mouth nightly. Yes Historical Provider, MD   aspirin 81 MG tablet Take 81 mg by mouth daily. Last dose 11/10/2014   Yes Historical Provider, MD       Allergies: No Known Allergies    Social History:   Social History   Substance Use Topics    Smoking status: Unknown If Ever Smoked    Smokeless tobacco: Not on file    Alcohol use Not on file       Past Surgical History:   has a past surgical history that includes Wrist surgery (10/14); Cardiac surgery; and Cholecystectomy (1985).     NSAID use in last 72 hours: no  Taken PCN in past:  unknown  Last food/drink: this AM  Last tetanus: unknown    Complaints:     Head: no  Neck: no  Chest: no  Back: no  Abdomen: no  Extremities: no  Comments:       SECONDARY SURVEY  Head/scalp: No soft tissue abdomen soft, non-tender, and non-distended. Bowel sounds present.